# Patient Record
Sex: MALE | Race: WHITE | NOT HISPANIC OR LATINO | Employment: FULL TIME | URBAN - NONMETROPOLITAN AREA
[De-identification: names, ages, dates, MRNs, and addresses within clinical notes are randomized per-mention and may not be internally consistent; named-entity substitution may affect disease eponyms.]

---

## 2017-01-03 ENCOUNTER — HOSPITAL ENCOUNTER (OUTPATIENT)
Dept: OTHER | Facility: HOSPITAL | Age: 47
Setting detail: RADIATION/ONCOLOGY SERIES
Discharge: HOME | End: 2017-01-20
Attending: INTERNAL MEDICINE | Admitting: HOSPITALIST

## 2017-01-25 DIAGNOSIS — R53.81 MALAISE AND FATIGUE: ICD-10-CM

## 2017-01-25 DIAGNOSIS — R53.83 MALAISE AND FATIGUE: ICD-10-CM

## 2017-01-25 DIAGNOSIS — D50.8 OTHER IRON DEFICIENCY ANEMIA: ICD-10-CM

## 2017-01-25 DIAGNOSIS — E03.9 ACQUIRED HYPOTHYROIDISM: Primary | ICD-10-CM

## 2017-01-25 DIAGNOSIS — E66.3 OVERWEIGHT: ICD-10-CM

## 2017-02-10 ENCOUNTER — APPOINTMENT (OUTPATIENT)
Dept: LAB | Facility: HOSPITAL | Age: 47
End: 2017-02-10

## 2017-02-10 LAB
25(OH)D3 SERPL-MCNC: 46.7 NG/ML (ref 30–100)
ALBUMIN SERPL-MCNC: 4.3 G/DL (ref 3.4–4.8)
ALBUMIN/GLOB SERPL: 1.4 G/DL (ref 1.1–1.8)
ALP SERPL-CCNC: 53 U/L (ref 38–126)
ALT SERPL W P-5'-P-CCNC: 36 U/L (ref 21–72)
ANION GAP SERPL CALCULATED.3IONS-SCNC: 9 MMOL/L (ref 5–15)
ARTICHOKE IGE QN: 102 MG/DL (ref 1–129)
AST SERPL-CCNC: 28 U/L (ref 17–59)
BASOPHILS # BLD AUTO: 0.02 10*3/MM3 (ref 0–0.2)
BASOPHILS NFR BLD AUTO: 0.3 % (ref 0–2)
BILIRUB SERPL-MCNC: 0.7 MG/DL (ref 0.2–1.3)
BUN BLD-MCNC: 18 MG/DL (ref 7–21)
BUN/CREAT SERPL: 19.6 (ref 7–25)
CALCIUM SPEC-SCNC: 9.3 MG/DL (ref 8.4–10.2)
CHLORIDE SERPL-SCNC: 100 MMOL/L (ref 95–110)
CHOLEST SERPL-MCNC: 189 MG/DL (ref 0–199)
CO2 SERPL-SCNC: 30 MMOL/L (ref 22–31)
CREAT BLD-MCNC: 0.92 MG/DL (ref 0.7–1.3)
DEPRECATED RDW RBC AUTO: 52.7 FL (ref 35.1–43.9)
EOSINOPHIL # BLD AUTO: 0.23 10*3/MM3 (ref 0–0.7)
EOSINOPHIL NFR BLD AUTO: 3.5 % (ref 0–7)
ERYTHROCYTE [DISTWIDTH] IN BLOOD BY AUTOMATED COUNT: 16.5 % (ref 11.5–14.5)
GFR SERPL CREATININE-BSD FRML MDRD: 88 ML/MIN/1.73 (ref 63–147)
GLOBULIN UR ELPH-MCNC: 3 GM/DL (ref 2.3–3.5)
GLUCOSE BLD-MCNC: 92 MG/DL (ref 60–100)
HBA1C MFR BLD: 5.48 % (ref 4–5.6)
HCT VFR BLD AUTO: 43.6 % (ref 39–49)
HDLC SERPL-MCNC: 48 MG/DL (ref 60–200)
HGB BLD-MCNC: 14.6 G/DL (ref 13.7–17.3)
IMM GRANULOCYTES # BLD: 0.02 10*3/MM3 (ref 0–0.02)
IMM GRANULOCYTES NFR BLD: 0.3 % (ref 0–0.5)
IRON 24H UR-MRATE: 72 MCG/DL (ref 49–181)
LDLC/HDLC SERPL: 2.52 {RATIO} (ref 0–3.55)
LYMPHOCYTES # BLD AUTO: 1.6 10*3/MM3 (ref 0.6–4.2)
LYMPHOCYTES NFR BLD AUTO: 24.2 % (ref 10–50)
MAGNESIUM SERPL-MCNC: 2.1 MG/DL (ref 1.6–2.3)
MCH RBC QN AUTO: 29.2 PG (ref 26.5–34)
MCHC RBC AUTO-ENTMCNC: 33.5 G/DL (ref 31.5–36.3)
MCV RBC AUTO: 87.2 FL (ref 80–98)
MONOCYTES # BLD AUTO: 0.47 10*3/MM3 (ref 0–0.9)
MONOCYTES NFR BLD AUTO: 7.1 % (ref 0–12)
NEUTROPHILS # BLD AUTO: 4.27 10*3/MM3 (ref 2–8.6)
NEUTROPHILS NFR BLD AUTO: 64.6 % (ref 37–80)
PLATELET # BLD AUTO: 196 10*3/MM3 (ref 150–450)
PMV BLD AUTO: 9.7 FL (ref 8–12)
POTASSIUM BLD-SCNC: 4 MMOL/L (ref 3.5–5.1)
PROT SERPL-MCNC: 7.3 G/DL (ref 6.3–8.6)
RBC # BLD AUTO: 5 10*6/MM3 (ref 4.37–5.74)
SODIUM BLD-SCNC: 139 MMOL/L (ref 137–145)
TRIGL SERPL-MCNC: 100 MG/DL (ref 20–199)
TSH SERPL DL<=0.05 MIU/L-ACNC: 3.86 MIU/ML (ref 0.46–4.68)
VIT B12 BLD-MCNC: 395 PG/ML (ref 239–931)
WBC NRBC COR # BLD: 6.61 10*3/MM3 (ref 3.2–9.8)

## 2017-02-10 PROCEDURE — 80061 LIPID PANEL: CPT | Performed by: NURSE PRACTITIONER

## 2017-02-10 PROCEDURE — 36415 COLL VENOUS BLD VENIPUNCTURE: CPT | Performed by: NURSE PRACTITIONER

## 2017-02-10 PROCEDURE — 80053 COMPREHEN METABOLIC PANEL: CPT | Performed by: NURSE PRACTITIONER

## 2017-02-10 PROCEDURE — 83036 HEMOGLOBIN GLYCOSYLATED A1C: CPT | Performed by: NURSE PRACTITIONER

## 2017-02-10 PROCEDURE — 83540 ASSAY OF IRON: CPT | Performed by: NURSE PRACTITIONER

## 2017-02-10 PROCEDURE — 85025 COMPLETE CBC W/AUTO DIFF WBC: CPT | Performed by: NURSE PRACTITIONER

## 2017-02-10 PROCEDURE — 84443 ASSAY THYROID STIM HORMONE: CPT | Performed by: NURSE PRACTITIONER

## 2017-02-10 PROCEDURE — 83735 ASSAY OF MAGNESIUM: CPT | Performed by: NURSE PRACTITIONER

## 2017-02-10 PROCEDURE — 82306 VITAMIN D 25 HYDROXY: CPT | Performed by: NURSE PRACTITIONER

## 2017-02-10 PROCEDURE — 82607 VITAMIN B-12: CPT | Performed by: NURSE PRACTITIONER

## 2017-02-13 ENCOUNTER — OFFICE VISIT (OUTPATIENT)
Dept: FAMILY MEDICINE CLINIC | Facility: CLINIC | Age: 47
End: 2017-02-13

## 2017-02-13 VITALS
HEIGHT: 69 IN | SYSTOLIC BLOOD PRESSURE: 120 MMHG | WEIGHT: 219 LBS | BODY MASS INDEX: 32.44 KG/M2 | DIASTOLIC BLOOD PRESSURE: 88 MMHG

## 2017-02-13 DIAGNOSIS — R53.81 MALAISE AND FATIGUE: ICD-10-CM

## 2017-02-13 DIAGNOSIS — R53.83 MALAISE AND FATIGUE: ICD-10-CM

## 2017-02-13 DIAGNOSIS — D50.8 OTHER IRON DEFICIENCY ANEMIA: Primary | ICD-10-CM

## 2017-02-13 DIAGNOSIS — E03.9 ACQUIRED HYPOTHYROIDISM: ICD-10-CM

## 2017-02-13 PROCEDURE — 99213 OFFICE O/P EST LOW 20 MIN: CPT | Performed by: NURSE PRACTITIONER

## 2017-02-13 NOTE — PROGRESS NOTES
"  Chief Complaint   Patient presents with   • Follow-up     iron deficiency     Subjective   Jonathon Carrero is a 46 y.o. male.     Fatigue   This is a new problem. The current episode started 1 to 4 weeks ago. The problem occurs constantly. The problem has been resolved. The symptoms are aggravated by exertion. He has tried nothing for the symptoms. The treatment provided mild relief.        The following portions of the patient's history were reviewed and updated as appropriate: allergies, current medications, past social history and problem list.    Review of Systems   HENT: Negative for sneezing.    Eyes: Negative.    Respiratory: Positive for chest tightness. Negative for apnea, choking and stridor.    Cardiovascular: Negative.    Gastrointestinal: Negative for abdominal distention, anal bleeding, blood in stool, constipation, diarrhea and rectal pain.   Endocrine: Negative.    Genitourinary: Negative.    Musculoskeletal: Negative for back pain and gait problem.   Skin: Negative.    Allergic/Immunologic: Negative.    Neurological: Negative.    Hematological: Negative.    Psychiatric/Behavioral: Negative.        Objective   Visit Vitals   • /88 (BP Location: Left arm, Patient Position: Sitting, Cuff Size: Adult)   • Ht 69\" (175.3 cm)   • Wt 219 lb (99.3 kg)   • BMI 32.34 kg/m2     Physical Exam   Constitutional: He is oriented to person, place, and time. He appears well-developed and well-nourished.   HENT:   Head: Normocephalic and atraumatic.   Eyes: Conjunctivae and EOM are normal. Pupils are equal, round, and reactive to light.   Neck: Normal range of motion. Neck supple.   Cardiovascular: Normal rate, regular rhythm and normal heart sounds.    Pulmonary/Chest: Effort normal. No respiratory distress. He has no wheezes. He has no rales. He exhibits no tenderness.   Abdominal: Soft. Bowel sounds are normal. He exhibits no distension and no mass. There is no tenderness. There is no rebound and no " guarding. No hernia.   Symptoms resolved   Musculoskeletal: Normal range of motion.   Neurological: He is alert and oriented to person, place, and time. He has normal reflexes.   Skin: Skin is warm.   Psychiatric: He has a normal mood and affect.   Nursing note and vitals reviewed.      Assessment/Plan   Problem List Items Addressed This Visit        Endocrine    Acquired hypothyroidism    Relevant Orders    CBC Auto Differential    Iron    TSH       Hematopoietic and Hemostatic    Iron deficiency anemia - Primary    Relevant Orders    CBC Auto Differential    Iron    TSH       Other    Malaise and fatigue    Relevant Orders    CBC Auto Differential    Iron    TSH         No orders of the defined types were placed in this encounter.      It's not just what you eat, but when you eat  Eat breakfast, and eat smaller meals throughout the day. A healthy breakfast can jumpstart your metabolism, while eating small, healthy meals (rather than the standard three large meals) keeps your energy up.   Avoid eating at night. Try to eat dinner earlier and fast for 14-16 hours until breakfast the next morning. Studies suggest that eating only when you’re most active and giving your digestive system a long break each day may help to regulate weight.

## 2017-04-26 ENCOUNTER — APPOINTMENT (OUTPATIENT)
Dept: LAB | Facility: HOSPITAL | Age: 47
End: 2017-04-26

## 2017-04-26 ENCOUNTER — OFFICE VISIT (OUTPATIENT)
Dept: FAMILY MEDICINE CLINIC | Facility: CLINIC | Age: 47
End: 2017-04-26

## 2017-04-26 VITALS
DIASTOLIC BLOOD PRESSURE: 82 MMHG | BODY MASS INDEX: 32.26 KG/M2 | HEIGHT: 69 IN | WEIGHT: 217.8 LBS | SYSTOLIC BLOOD PRESSURE: 122 MMHG

## 2017-04-26 DIAGNOSIS — K21.9 GASTROESOPHAGEAL REFLUX DISEASE WITHOUT ESOPHAGITIS: Primary | ICD-10-CM

## 2017-04-26 DIAGNOSIS — E03.9 HYPOTHYROIDISM (ACQUIRED): Primary | ICD-10-CM

## 2017-04-26 DIAGNOSIS — R53.81 MALAISE: ICD-10-CM

## 2017-04-26 DIAGNOSIS — E03.9 ACQUIRED HYPOTHYROIDISM: ICD-10-CM

## 2017-04-26 DIAGNOSIS — D50.8 OTHER IRON DEFICIENCY ANEMIA: ICD-10-CM

## 2017-04-26 LAB
ALBUMIN SERPL-MCNC: 4.3 G/DL (ref 3.4–4.8)
ALBUMIN/GLOB SERPL: 1.4 G/DL (ref 1.1–1.8)
ALP SERPL-CCNC: 47 U/L (ref 38–126)
ALT SERPL W P-5'-P-CCNC: 34 U/L (ref 21–72)
ANION GAP SERPL CALCULATED.3IONS-SCNC: 9 MMOL/L (ref 5–15)
AST SERPL-CCNC: 35 U/L (ref 17–59)
BASOPHILS # BLD AUTO: 0.01 10*3/MM3 (ref 0–0.2)
BASOPHILS NFR BLD AUTO: 0.2 % (ref 0–2)
BILIRUB SERPL-MCNC: 0.7 MG/DL (ref 0.2–1.3)
BUN BLD-MCNC: 15 MG/DL (ref 7–21)
BUN/CREAT SERPL: 16.5 (ref 7–25)
CALCIUM SPEC-SCNC: 9.2 MG/DL (ref 8.4–10.2)
CHLORIDE SERPL-SCNC: 102 MMOL/L (ref 95–110)
CO2 SERPL-SCNC: 27 MMOL/L (ref 22–31)
CREAT BLD-MCNC: 0.91 MG/DL (ref 0.7–1.3)
DEPRECATED RDW RBC AUTO: 40.8 FL (ref 35.1–43.9)
EOSINOPHIL # BLD AUTO: 0.22 10*3/MM3 (ref 0–0.7)
EOSINOPHIL NFR BLD AUTO: 4.1 % (ref 0–7)
ERYTHROCYTE [DISTWIDTH] IN BLOOD BY AUTOMATED COUNT: 12.7 % (ref 11.5–14.5)
GFR SERPL CREATININE-BSD FRML MDRD: 89 ML/MIN/1.73 (ref 63–147)
GLOBULIN UR ELPH-MCNC: 3 GM/DL (ref 2.3–3.5)
GLUCOSE BLD-MCNC: 94 MG/DL (ref 60–100)
HCT VFR BLD AUTO: 39.7 % (ref 39–49)
HGB BLD-MCNC: 14 G/DL (ref 13.7–17.3)
IMM GRANULOCYTES # BLD: 0.01 10*3/MM3 (ref 0–0.02)
IMM GRANULOCYTES NFR BLD: 0.2 % (ref 0–0.5)
IRON 24H UR-MRATE: 48 MCG/DL (ref 49–181)
LYMPHOCYTES # BLD AUTO: 1.4 10*3/MM3 (ref 0.6–4.2)
LYMPHOCYTES NFR BLD AUTO: 25.9 % (ref 10–50)
MCH RBC QN AUTO: 31.5 PG (ref 26.5–34)
MCHC RBC AUTO-ENTMCNC: 35.3 G/DL (ref 31.5–36.3)
MCV RBC AUTO: 89.2 FL (ref 80–98)
MONOCYTES # BLD AUTO: 0.48 10*3/MM3 (ref 0–0.9)
MONOCYTES NFR BLD AUTO: 8.9 % (ref 0–12)
NEUTROPHILS # BLD AUTO: 3.29 10*3/MM3 (ref 2–8.6)
NEUTROPHILS NFR BLD AUTO: 60.7 % (ref 37–80)
PLATELET # BLD AUTO: 196 10*3/MM3 (ref 150–450)
PMV BLD AUTO: 9.8 FL (ref 8–12)
POTASSIUM BLD-SCNC: 4.2 MMOL/L (ref 3.5–5.1)
PROT SERPL-MCNC: 7.3 G/DL (ref 6.3–8.6)
PSA SERPL-MCNC: 0.4 NG/ML (ref 0–4)
RBC # BLD AUTO: 4.45 10*6/MM3 (ref 4.37–5.74)
SODIUM BLD-SCNC: 138 MMOL/L (ref 137–145)
TSH SERPL DL<=0.05 MIU/L-ACNC: 4.09 MIU/ML (ref 0.46–4.68)
WBC NRBC COR # BLD: 5.41 10*3/MM3 (ref 3.2–9.8)

## 2017-04-26 PROCEDURE — 85025 COMPLETE CBC W/AUTO DIFF WBC: CPT | Performed by: NURSE PRACTITIONER

## 2017-04-26 PROCEDURE — 84443 ASSAY THYROID STIM HORMONE: CPT | Performed by: NURSE PRACTITIONER

## 2017-04-26 PROCEDURE — 36415 COLL VENOUS BLD VENIPUNCTURE: CPT | Performed by: NURSE PRACTITIONER

## 2017-04-26 PROCEDURE — 84153 ASSAY OF PSA TOTAL: CPT | Performed by: NURSE PRACTITIONER

## 2017-04-26 PROCEDURE — 84402 ASSAY OF FREE TESTOSTERONE: CPT | Performed by: NURSE PRACTITIONER

## 2017-04-26 PROCEDURE — 80053 COMPREHEN METABOLIC PANEL: CPT | Performed by: NURSE PRACTITIONER

## 2017-04-26 PROCEDURE — 83540 ASSAY OF IRON: CPT | Performed by: NURSE PRACTITIONER

## 2017-04-26 PROCEDURE — 99214 OFFICE O/P EST MOD 30 MIN: CPT | Performed by: NURSE PRACTITIONER

## 2017-04-26 PROCEDURE — 84403 ASSAY OF TOTAL TESTOSTERONE: CPT | Performed by: NURSE PRACTITIONER

## 2017-04-26 RX ORDER — LEVOTHYROXINE SODIUM 0.12 MG/1
125 TABLET ORAL DAILY
Qty: 30 TABLET | Refills: 11 | Status: SHIPPED | OUTPATIENT
Start: 2017-04-26 | End: 2017-11-29 | Stop reason: SDUPTHER

## 2017-04-26 RX ORDER — LEVOTHYROXINE SODIUM 125 MCG
125 TABLET ORAL DAILY
Qty: 30 TABLET | Refills: 11 | Status: SHIPPED | OUTPATIENT
Start: 2017-04-26 | End: 2017-05-04 | Stop reason: SDUPTHER

## 2017-04-26 RX ORDER — FERROUS SULFATE 325(65) MG
325 TABLET ORAL
Qty: 30 TABLET | Refills: 0 | Status: SHIPPED | OUTPATIENT
Start: 2017-04-26 | End: 2017-08-14 | Stop reason: SDUPTHER

## 2017-04-26 NOTE — PROGRESS NOTES
Chief Complaint   Patient presents with   • Follow-up     wanting referred to Gastro for EGD   • Heartburn     has discomfort    • Bloated     Subjective   Jonathon Carrero is a 47 y.o. male.     Heartburn   He complains of abdominal pain, belching, chest pain, dysphagia and heartburn. He reports no choking, no coughing, no early satiety, no globus sensation, no hoarse voice, no nausea, no sore throat, no stridor, no tooth decay, no water brash or no wheezing. hx of known gerd but symptoms are worsening -has been taking otc iron supplements and stopped taking this week due to pain-thinks it is making worse . This is a recurrent problem. The current episode started more than 1 month ago. The problem occurs constantly. The problem has been gradually worsening. The symptoms are aggravated by caffeine. Associated symptoms include fatigue. Pertinent negatives include no anemia, melena, muscle weakness, orthopnea or weight loss. Risk factors include caffeine use and ETOH use. He has tried a PPI for the symptoms. The treatment provided mild relief. Past procedures do not include an abdominal ultrasound, an EGD, esophageal manometry, esophageal pH monitoring, H. pylori antibody titer or a UGI. Past invasive treatments do not include gastroplasty, gastroplication or reflux surgery.   Fatigue   This is a recurrent problem. The current episode started more than 1 month ago. The problem occurs constantly. The problem has been gradually worsening. Associated symptoms include abdominal pain, chest pain and fatigue. Pertinent negatives include no anorexia, arthralgias, chills, congestion, coughing, fever, headaches, joint swelling, myalgias, nausea, neck pain, numbness, rash, sore throat, swollen glands, urinary symptoms, vertigo or visual change. Nothing aggravates the symptoms. Treatments tried: rest, exercise  The treatment provided mild relief.        The following portions of the patient's history were reviewed and  "updated as appropriate: allergies, current medications, past social history and problem list.    Review of Systems   Constitutional: Positive for activity change and fatigue. Negative for chills, fever and weight loss.   HENT: Negative.  Negative for congestion, hoarse voice and sore throat.    Eyes: Negative.    Respiratory: Negative.  Negative for cough, choking and wheezing.    Cardiovascular: Positive for chest pain. Negative for palpitations and leg swelling.   Gastrointestinal: Positive for abdominal distention, abdominal pain, dysphagia and heartburn. Negative for anal bleeding, anorexia, blood in stool, melena and nausea.        Gerd symptoms worsening-reports worse since taking iron supplements    Endocrine: Negative.    Genitourinary: Negative.    Musculoskeletal: Negative.  Negative for arthralgias, joint swelling, myalgias, muscle weakness and neck pain.   Skin: Negative.  Negative for rash.   Allergic/Immunologic: Negative.    Neurological: Negative.  Negative for vertigo, numbness and headaches.   Hematological: Negative.    Psychiatric/Behavioral: Negative.        Objective   /82  Ht 69\" (175.3 cm)  Wt 217 lb 12.8 oz (98.8 kg)  BMI 32.16 kg/m2  Physical Exam   Constitutional: He is oriented to person, place, and time. He appears well-developed and well-nourished.   HENT:   Head: Normocephalic and atraumatic.   Eyes: Conjunctivae and EOM are normal. Pupils are equal, round, and reactive to light.   Neck: Normal range of motion. Neck supple.   Cardiovascular: Normal rate, regular rhythm, normal heart sounds and intact distal pulses.  Exam reveals no gallop and no friction rub.    No murmur heard.  Pulmonary/Chest: Effort normal. No respiratory distress. He has no wheezes. He has no rales. He exhibits no tenderness.   Abdominal: Soft. Bowel sounds are normal. He exhibits no distension and no mass. There is tenderness. There is no rebound and no guarding. No hernia.   Epigastric tenderness with " exam    Musculoskeletal: Normal range of motion. He exhibits no edema, tenderness or deformity.   Neurological: He is alert and oriented to person, place, and time. He has normal reflexes. He displays normal reflexes. No cranial nerve deficit. He exhibits normal muscle tone. Coordination normal.   Skin: Skin is warm and dry.   Psychiatric: He has a normal mood and affect.   Nursing note and vitals reviewed.      Assessment/Plan   Problem List Items Addressed This Visit        Digestive    Gastroesophageal reflux disease without esophagitis - Primary    Relevant Orders    Ambulatory Referral to Gastroenterology    CBC & Differential (Completed)    Comprehensive Metabolic Panel (Completed)    PSA (Completed)    Testosterone, Free, Total    CBC Auto Differential (Completed)       Endocrine    Acquired hypothyroidism    Relevant Medications    SYNTHROID 125 MCG tablet       Hematopoietic and Hemostatic    Iron deficiency anemia    Relevant Medications    ferrous sulfate (FERROUSUL) 325 (65 FE) MG tablet       Other    Malaise    Relevant Orders    CBC & Differential (Completed)    Comprehensive Metabolic Panel (Completed)    PSA (Completed)    Testosterone, Free, Total    CBC Auto Differential (Completed)           New Medications Ordered This Visit   Medications   • SYNTHROID 125 MCG tablet     Sig: Take 1 tablet by mouth Daily.     Dispense:  30 tablet     Refill:  11   • ferrous sulfate (FERROUSUL) 325 (65 FE) MG tablet     Sig: Take 1 tablet by mouth Daily With Breakfast.     Dispense:  30 tablet     Refill:  0       It's not just what you eat, but when you eat  Eat breakfast, and eat smaller meals throughout the day. A healthy breakfast can jumpstart your metabolism, while eating small, healthy meals (rather than the standard three large meals) keeps your energy up.   Avoid eating at night. Try to eat dinner earlier and fast for 14-16 hours until breakfast the next morning. Studies suggest that eating only when  you’re most active and giving your digestive system a long break each day may help to regulate weight.    I would suggest patient to have another iron infusion due to symptoms with otc iron and gastritis -hold iron for now and will order infusion as directed

## 2017-04-28 ENCOUNTER — INFUSION (OUTPATIENT)
Dept: ONCOLOGY | Facility: HOSPITAL | Age: 47
End: 2017-04-28

## 2017-04-28 VITALS
SYSTOLIC BLOOD PRESSURE: 151 MMHG | RESPIRATION RATE: 16 BRPM | DIASTOLIC BLOOD PRESSURE: 79 MMHG | HEART RATE: 65 BPM | TEMPERATURE: 97.1 F

## 2017-04-28 DIAGNOSIS — D50.8 OTHER IRON DEFICIENCY ANEMIA: Primary | ICD-10-CM

## 2017-04-28 LAB
CONV COMMENT: ABNORMAL
TESTOST FREE SERPL-MCNC: 8.1 PG/ML (ref 6.8–21.5)
TESTOST SERPL-MCNC: 331 NG/DL (ref 348–1197)

## 2017-04-28 PROCEDURE — 96375 TX/PRO/DX INJ NEW DRUG ADDON: CPT | Performed by: HOSPITALIST

## 2017-04-28 PROCEDURE — 25010000002 FERUMOXYTOL 510 MG/17ML SOLUTION 510 MG VIAL: Performed by: HOSPITALIST

## 2017-04-28 PROCEDURE — 96365 THER/PROPH/DIAG IV INF INIT: CPT | Performed by: HOSPITALIST

## 2017-04-28 RX ORDER — SODIUM CHLORIDE 9 MG/ML
250 INJECTION, SOLUTION INTRAVENOUS ONCE
Status: COMPLETED | OUTPATIENT
Start: 2017-04-28 | End: 2017-04-28

## 2017-04-28 RX ORDER — TESTOSTERONE 20.25 MG/1.25G
1 GEL TOPICAL DAILY
Qty: 1.25 G | Refills: 0 | Status: SHIPPED | OUTPATIENT
Start: 2017-04-28 | End: 2017-05-10 | Stop reason: CLARIF

## 2017-04-28 RX ORDER — SODIUM CHLORIDE 9 MG/ML
250 INJECTION, SOLUTION INTRAVENOUS ONCE
Status: CANCELLED | OUTPATIENT
Start: 2017-05-05

## 2017-04-28 RX ADMIN — SODIUM CHLORIDE 250 ML: 9 INJECTION, SOLUTION INTRAVENOUS at 10:50

## 2017-04-28 RX ADMIN — FERUMOXYTOL 510 MG: 510 INJECTION INTRAVENOUS at 11:17

## 2017-04-28 NOTE — PROGRESS NOTES
LANRE Cooper,  Mr. Carrero has been called with his recent lab results & recommendations.  Continue his current medications and follow-up as planned or sooner if any problems.  Appt scheduled for Monday 5/15/17 @ 8:15. For testosterone replacement therapy   Unless you want to call and talk to him over the phone.

## 2017-05-04 ENCOUNTER — OFFICE VISIT (OUTPATIENT)
Dept: GASTROENTEROLOGY | Facility: CLINIC | Age: 47
End: 2017-05-04

## 2017-05-04 VITALS
BODY MASS INDEX: 32.47 KG/M2 | HEART RATE: 163 BPM | WEIGHT: 219.2 LBS | DIASTOLIC BLOOD PRESSURE: 79 MMHG | HEIGHT: 69 IN | SYSTOLIC BLOOD PRESSURE: 118 MMHG

## 2017-05-04 DIAGNOSIS — K21.00 GASTROESOPHAGEAL REFLUX DISEASE WITH ESOPHAGITIS: ICD-10-CM

## 2017-05-04 DIAGNOSIS — Z87.19 HISTORY OF BARRETT'S ESOPHAGUS: ICD-10-CM

## 2017-05-04 DIAGNOSIS — R14.0 BLOATING: ICD-10-CM

## 2017-05-04 DIAGNOSIS — R10.13 EPIGASTRIC PAIN: Primary | ICD-10-CM

## 2017-05-04 PROCEDURE — 99203 OFFICE O/P NEW LOW 30 MIN: CPT | Performed by: NURSE PRACTITIONER

## 2017-05-04 RX ORDER — DEXTROSE AND SODIUM CHLORIDE 5; .45 G/100ML; G/100ML
30 INJECTION, SOLUTION INTRAVENOUS CONTINUOUS PRN
Status: CANCELLED | OUTPATIENT
Start: 2017-05-26

## 2017-05-04 RX ORDER — PANTOPRAZOLE SODIUM 40 MG/1
40 TABLET, DELAYED RELEASE ORAL DAILY
Qty: 30 TABLET | Refills: 11 | Status: SHIPPED | OUTPATIENT
Start: 2017-05-04 | End: 2017-06-06

## 2017-05-05 ENCOUNTER — INFUSION (OUTPATIENT)
Dept: ONCOLOGY | Facility: HOSPITAL | Age: 47
End: 2017-05-05

## 2017-05-05 VITALS
RESPIRATION RATE: 18 BRPM | SYSTOLIC BLOOD PRESSURE: 142 MMHG | DIASTOLIC BLOOD PRESSURE: 76 MMHG | HEART RATE: 71 BPM | TEMPERATURE: 96.9 F

## 2017-05-05 DIAGNOSIS — D50.8 OTHER IRON DEFICIENCY ANEMIA: Primary | ICD-10-CM

## 2017-05-05 PROCEDURE — 25010000002 FERUMOXYTOL 510 MG/17ML SOLUTION 510 MG VIAL: Performed by: HOSPITALIST

## 2017-05-05 PROCEDURE — 96374 THER/PROPH/DIAG INJ IV PUSH: CPT | Performed by: HOSPITALIST

## 2017-05-05 RX ORDER — SODIUM CHLORIDE 9 MG/ML
250 INJECTION, SOLUTION INTRAVENOUS ONCE
Status: COMPLETED | OUTPATIENT
Start: 2017-05-05 | End: 2017-05-05

## 2017-05-05 RX ORDER — SODIUM CHLORIDE 9 MG/ML
250 INJECTION, SOLUTION INTRAVENOUS ONCE
Status: CANCELLED | OUTPATIENT
Start: 2017-05-12

## 2017-05-05 RX ADMIN — SODIUM CHLORIDE 250 ML: 9 INJECTION, SOLUTION INTRAVENOUS at 10:17

## 2017-05-05 RX ADMIN — FERUMOXYTOL 510 MG: 510 INJECTION INTRAVENOUS at 10:18

## 2017-05-10 DIAGNOSIS — R79.89 LOW TESTOSTERONE LEVEL IN MALE: Primary | ICD-10-CM

## 2017-05-26 ENCOUNTER — ANESTHESIA EVENT (OUTPATIENT)
Dept: GASTROENTEROLOGY | Facility: HOSPITAL | Age: 47
End: 2017-05-26

## 2017-05-26 ENCOUNTER — HOSPITAL ENCOUNTER (OUTPATIENT)
Facility: HOSPITAL | Age: 47
Setting detail: HOSPITAL OUTPATIENT SURGERY
Discharge: HOME | End: 2017-05-26
Attending: INTERNAL MEDICINE | Admitting: INTERNAL MEDICINE

## 2017-05-26 ENCOUNTER — ANESTHESIA (OUTPATIENT)
Dept: GASTROENTEROLOGY | Facility: HOSPITAL | Age: 47
End: 2017-05-26

## 2017-05-26 VITALS
HEIGHT: 69 IN | SYSTOLIC BLOOD PRESSURE: 106 MMHG | TEMPERATURE: 97.6 F | BODY MASS INDEX: 32.2 KG/M2 | WEIGHT: 217.37 LBS | OXYGEN SATURATION: 95 % | RESPIRATION RATE: 18 BRPM | HEART RATE: 72 BPM | DIASTOLIC BLOOD PRESSURE: 65 MMHG

## 2017-05-26 DIAGNOSIS — R14.0 BLOATING: ICD-10-CM

## 2017-05-26 DIAGNOSIS — K21.00 GASTROESOPHAGEAL REFLUX DISEASE WITH ESOPHAGITIS: ICD-10-CM

## 2017-05-26 DIAGNOSIS — R10.13 EPIGASTRIC PAIN: ICD-10-CM

## 2017-05-26 DIAGNOSIS — Z87.19 HISTORY OF BARRETT'S ESOPHAGUS: ICD-10-CM

## 2017-05-26 PROCEDURE — 88342 IMHCHEM/IMCYTCHM 1ST ANTB: CPT | Performed by: PATHOLOGY

## 2017-05-26 PROCEDURE — 88305 TISSUE EXAM BY PATHOLOGIST: CPT | Performed by: INTERNAL MEDICINE

## 2017-05-26 PROCEDURE — 25010000002 PROPOFOL 10 MG/ML EMULSION: Performed by: NURSE ANESTHETIST, CERTIFIED REGISTERED

## 2017-05-26 PROCEDURE — 25010000002 MIDAZOLAM PER 1 MG: Performed by: NURSE ANESTHETIST, CERTIFIED REGISTERED

## 2017-05-26 PROCEDURE — 43239 EGD BIOPSY SINGLE/MULTIPLE: CPT | Performed by: INTERNAL MEDICINE

## 2017-05-26 PROCEDURE — 88305 TISSUE EXAM BY PATHOLOGIST: CPT | Performed by: PATHOLOGY

## 2017-05-26 PROCEDURE — 88342 IMHCHEM/IMCYTCHM 1ST ANTB: CPT | Performed by: INTERNAL MEDICINE

## 2017-05-26 RX ORDER — PROPOFOL 10 MG/ML
VIAL (ML) INTRAVENOUS AS NEEDED
Status: DISCONTINUED | OUTPATIENT
Start: 2017-05-26 | End: 2017-05-26 | Stop reason: SURG

## 2017-05-26 RX ORDER — DEXTROSE AND SODIUM CHLORIDE 5; .45 G/100ML; G/100ML
30 INJECTION, SOLUTION INTRAVENOUS CONTINUOUS PRN
Status: DISCONTINUED | OUTPATIENT
Start: 2017-05-26 | End: 2017-05-26 | Stop reason: HOSPADM

## 2017-05-26 RX ORDER — MIDAZOLAM HYDROCHLORIDE 1 MG/ML
INJECTION INTRAMUSCULAR; INTRAVENOUS AS NEEDED
Status: DISCONTINUED | OUTPATIENT
Start: 2017-05-26 | End: 2017-05-26 | Stop reason: SURG

## 2017-05-26 RX ADMIN — DEXTROSE AND SODIUM CHLORIDE 30 ML/HR: 5; 450 INJECTION, SOLUTION INTRAVENOUS at 11:52

## 2017-05-26 RX ADMIN — MIDAZOLAM 2 MG: 1 INJECTION INTRAMUSCULAR; INTRAVENOUS at 12:45

## 2017-05-26 RX ADMIN — PROPOFOL 70 MG: 10 INJECTION, EMULSION INTRAVENOUS at 12:50

## 2017-05-31 LAB
LAB AP CASE REPORT: NORMAL
Lab: NORMAL
PATH REPORT.FINAL DX SPEC: NORMAL
PATH REPORT.GROSS SPEC: NORMAL

## 2017-06-06 ENCOUNTER — OFFICE VISIT (OUTPATIENT)
Dept: GASTROENTEROLOGY | Facility: CLINIC | Age: 47
End: 2017-06-06

## 2017-06-06 VITALS
HEART RATE: 75 BPM | BODY MASS INDEX: 32.41 KG/M2 | HEIGHT: 69 IN | WEIGHT: 218.8 LBS | SYSTOLIC BLOOD PRESSURE: 168 MMHG | DIASTOLIC BLOOD PRESSURE: 81 MMHG

## 2017-06-06 DIAGNOSIS — R10.13 EPIGASTRIC PAIN: ICD-10-CM

## 2017-06-06 DIAGNOSIS — R13.10 DYSPHAGIA, UNSPECIFIED TYPE: Primary | ICD-10-CM

## 2017-06-06 PROCEDURE — 99214 OFFICE O/P EST MOD 30 MIN: CPT | Performed by: NURSE PRACTITIONER

## 2017-06-06 RX ORDER — DEXLANSOPRAZOLE 60 MG/1
60 CAPSULE, DELAYED RELEASE ORAL DAILY
Qty: 30 CAPSULE | Refills: 3 | Status: SHIPPED | OUTPATIENT
Start: 2017-06-06 | End: 2017-07-06

## 2017-06-06 RX ORDER — SUCRALFATE 1 G/1
1 TABLET ORAL 4 TIMES DAILY
Qty: 120 TABLET | Refills: 3 | Status: SHIPPED | OUTPATIENT
Start: 2017-06-06 | End: 2017-08-19 | Stop reason: HOSPADM

## 2017-06-06 NOTE — PROGRESS NOTES
Chief Complaint   Patient presents with   • EGD     follow-up       Subjective    Jonathon Carrero is a 47 y.o. male. he is here today for follow-up.    History of Present Illness  Patient seen for follow-up of epigastric pain and worsening reflux.  States is currently not having any pain however still occurs intermittently described as acute sharp pain that will last several hours associated with bloating and painful swallowing.  Denies any changes in his bowel habits he denies any melena or hematochezia.  EGD was completed 5/26/17 which noted severe esophagitis without bleeding, gastritis, normal duodenum.  Gastric antrum biopsy noted reactive gastropathy, negative for H. pylori.  Distal esophagus biopsy noted benign gastric and squama's mucosa, active gastritis with granulation tissue response fibropurulent exudate consistent with ulcer.  Negative for specialized Lewis's mucosa.  Patient has been on Protonix for the last month states he has had fewer breakthrough symptoms however frequently he does have symptoms at night.  Plan; we'll discontinue Protonix and start Dexilant 60 mg per day.  Schedule esophagram.  Start Carafate 4 times per day.  Follow-up in one to 2 months.  Return to office sooner if needed.    The following portions of the patient's history were reviewed and updated as appropriate:   Past Medical History:   Diagnosis Date   • Acquired hypothyroidism    • Acute bronchitis    • Acute maxillary sinusitis    • Acute otitis media    • Acute pharyngitis    • Acute wheezy bronchitis    • Contracture of ankle    • Deformity of calcaneus    • Iron deficiency anemia    • Malaise and fatigue    • Obesity     bmi 32   • Peroneal tendinitis    • Preoperative examination     surgical clearance   • Surgical followup    • TMJ (temporomandibular joint disorder)      Past Surgical History:   Procedure Laterality Date   • COLONOSCOPY  03/15/2013    Internal and external hemorrhoids found.   • DEBRIDEMENT LEG   12/04/2014    Debridement and repair of peroneal tendon as well as resection of peroneal tubercle right   • ENDOSCOPY  03/15/2013    esophagitis seen. Biopsy taken. A hiatus hernia found in the stomach. Gastritis found in the stomach. Biopsy taken. Normal duodenum.   • ENDOSCOPY  06/16/2011    esophagitis seen. Biopsy taken. A hiatus hernia found in the stomach. Gastritis found in the stomach. Biopsy taken. Normal duodenum.   • ENDOSCOPY N/A 5/26/2017    Procedure: ESOPHAGOGASTRODUODENOSCOPY possible dilation ;  Surgeon: Murali Brower MD;  Location: [facility] ENDOSCOPY;  Service:    • ENTEROSCOPY SMALL BOWEL  03/15/2013    Capsule took greater then 3 hours to pass through stomach. Small bowel appeared normal. Although prp was robert.   • INJECTION OF MEDICATION  09/22/2013    Dexamethasone   • INJECTION OF MEDICATION  10/10/2015    Kenalog   • INJECTION OF MEDICATION  10/10/2015    Rocephin   • JOINT/ SOFT TISSUE INJECTION CONVERTED  09/22/2013    Small Joint Injection/Aspiration      Family History   Problem Relation Age of Onset   • Diabetes Other    • Heart attack Father        Current Outpatient Prescriptions   Medication Sig Dispense Refill   • ferrous sulfate (FERROUSUL) 325 (65 FE) MG tablet Take 1 tablet by mouth Daily With Breakfast. 30 tablet 0   • levothyroxine (SYNTHROID) 125 MCG tablet Take 1 tablet by mouth Daily. 30 tablet 11   • testosterone (ANDRODERM) 2 MG/24HR patch 24 hour 24 hour patch Place 1 patch on the skin Every Night. 30 patch 5   • dexlansoprazole (DEXILANT) 60 MG capsule Take 1 capsule by mouth Daily for 30 days. 30 capsule 3   • sucralfate (CARAFATE) 1 G tablet Take 1 tablet by mouth 4 (Four) Times a Day. 120 tablet 3     No current facility-administered medications for this visit.      No Known Allergies  Social History     Social History   • Marital status:      Spouse name: N/A   • Number of children: N/A   • Years of education: N/A     Social History Main Topics   • Smoking  "status: Former Smoker   • Smokeless tobacco: Never Used   • Alcohol use No   • Drug use: Not on file   • Sexual activity: Not on file     Other Topics Concern   • Not on file     Social History Narrative       Review of Systems  Review of Systems   Constitutional: Negative for activity change, appetite change, chills, diaphoresis, fatigue, fever and unexpected weight change.   HENT: Negative for sore throat and trouble swallowing.    Respiratory: Negative for shortness of breath.    Gastrointestinal: Positive for abdominal pain (epigastric). Negative for abdominal distention, anal bleeding, blood in stool, constipation, diarrhea, nausea, rectal pain and vomiting.   Musculoskeletal: Negative for arthralgias.   Skin: Negative for pallor.   Neurological: Negative for light-headedness.        /81  Pulse 75  Ht 69\" (175.3 cm)  Wt 218 lb 12.8 oz (99.2 kg)  BMI 32.31 kg/m2    Objective    Physical Exam   Constitutional: He is oriented to person, place, and time. He appears well-developed and well-nourished. He is cooperative. No distress.   HENT:   Head: Normocephalic and atraumatic.   Neck: Normal range of motion. Neck supple. No thyromegaly present.   Cardiovascular: Normal rate, regular rhythm and normal heart sounds.    Pulmonary/Chest: Effort normal and breath sounds normal. He has no wheezes. He has no rhonchi. He has no rales.   Abdominal: Soft. Normal appearance and bowel sounds are normal. He exhibits no shifting dullness and no distension. There is no hepatosplenomegaly. There is no tenderness. There is no rigidity and no guarding. No hernia.   Lymphadenopathy:     He has no cervical adenopathy.   Neurological: He is alert and oriented to person, place, and time.   Skin: Skin is warm, dry and intact. No rash noted. No pallor.   Psychiatric: He has a normal mood and affect. His speech is normal.     Admission on 05/26/2017, Discharged on 05/26/2017   Component Date Value Ref Range Status   • Case Report " 05/26/2017    Final                    Value:Surgical Pathology Report                         Case: [id number]                                  Authorizing Provider:  Murali Brower MD         Collected:           05/26/2017 01:04 PM          Ordering Location:     [facility]             Received:            05/26/2017 01:48 PM                                 [facility]                                                     Pathologist:           Mynor Montelongo MD                                                            Specimens:   1) - Gastric, Antrum, antrum bx                                                                     2) - Esophagus, Distal, distal esophagus bx                                               • Final Diagnosis 05/26/2017    Final                    Value:This result contains rich text formatting which cannot be displayed here.   • Gross Description 05/26/2017    Final                    Value:This result contains rich text formatting which cannot be displayed here.     Assessment/Plan      1. Dysphagia, unspecified type    2. Epigastric pain    .       Orders placed during this encounter include:  Orders Placed This Encounter   Procedures   • FL Esophagram Complete     Order Specific Question:   Reason for Exam:     Answer:   painful swallowing, epigastric pain       * Surgery not found *    Review and/or summary of lab tests, radiology, procedures, medications. Review and summary of old records and obtaining of history. The risks and benefits of my recommendations, as well as other treatment options were discussed with the patient today. Questions were answered.    New Medications Ordered This Visit   Medications   • dexlansoprazole (DEXILANT) 60 MG capsule     Sig: Take 1 capsule by mouth Daily for 30 days.     Dispense:  30 capsule     Refill:  3   • sucralfate (CARAFATE) 1 G tablet     Sig: Take 1 tablet by mouth 4 (Four) Times a Day.     Dispense:  120 tablet      "Refill:  3       Follow-up: No Follow-up on file.          This document has been electronically signed by LANRE Choe on June 6, 2017 1:45 PM             Results for orders placed or performed during the hospital encounter of 05/26/17   Tissue Exam   Result Value Ref Range    Case Report       Surgical Pathology Report                         Case: [id number]                                  Authorizing Provider:  Murali Brower MD         Collected:           05/26/2017 01:04 PM          Ordering Location:     [facility]             Received:            05/26/2017 01:48 PM                                 [facility]                                                     Pathologist:           Mynor Montelongo MD                                                            Specimens:   1) - Gastric, Antrum, antrum bx                                                                     2) - Esophagus, Distal, distal esophagus bx                                                Final Diagnosis       1. GASTRIC ANTRUM, BIOPSY:  REACTIVE GASTROPATHY.  NEGATIVE FOR HELICOBACTER PYLORI (HP IMMUNOSTAIN).    2. DISTAL ESOPHAGUS, BIOPSY:  BENIGN GASTRIC AND SQUAMOUS MUCOSA.  ACTIVE GASTRITIS WITH GRANULATION TISSUE RESPONSE.  FIBRINOPURULENT EXUDATE, CONSISTENT WITH ULCER.  NEGATIVE FOR SPECIALIZED PEACE'S MUCOSA.        Gross Description       1.  The first specimen is labeled \"gastric antrum\" and consists of 2 tan fragments measuring 0.8 x 0.4 x 0.2 cm together.  Totally submitted.    2.  The second specimen is labeled \"distal esophagus bx\" and consists of 4 gray fragments measuring 0.8 x 0.7 x 0.2 cm together.  Totally submitted.      Embedded Images     Results for orders placed or performed in visit on 04/26/17   CBC Auto Differential   Result Value Ref Range    WBC 5.41 3.20 - 9.80 10*3/mm3    RBC 4.45 4.37 - 5.74 10*6/mm3    Hemoglobin 14.0 13.7 - 17.3 g/dL    Hematocrit 39.7 39.0 - 49.0 %    MCV " 89.2 80.0 - 98.0 fL    MCH 31.5 26.5 - 34.0 pg    MCHC 35.3 31.5 - 36.3 g/dL    RDW 12.7 11.5 - 14.5 %    RDW-SD 40.8 35.1 - 43.9 fl    MPV 9.8 8.0 - 12.0 fL    Platelets 196 150 - 450 10*3/mm3    Neutrophil % 60.7 37.0 - 80.0 %    Lymphocyte % 25.9 10.0 - 50.0 %    Monocyte % 8.9 0.0 - 12.0 %    Eosinophil % 4.1 0.0 - 7.0 %    Basophil % 0.2 0.0 - 2.0 %    Immature Grans % 0.2 0.0 - 0.5 %    Neutrophils, Absolute 3.29 2.00 - 8.60 10*3/mm3    Lymphocytes, Absolute 1.40 0.60 - 4.20 10*3/mm3    Monocytes, Absolute 0.48 0.00 - 0.90 10*3/mm3    Eosinophils, Absolute 0.22 0.00 - 0.70 10*3/mm3    Basophils, Absolute 0.01 0.00 - 0.20 10*3/mm3    Immature Grans, Absolute 0.01 0.00 - 0.02 10*3/mm3   Testosterone, Free, Total   Result Value Ref Range    Testosterone, Total 331 (L) 348 - 1197 ng/dL    Comment Comment     Testosterone, Free 8.1 6.8 - 21.5 pg/mL   PSA   Result Value Ref Range    PSA 0.398 0.000 - 4.000 ng/mL   Comprehensive Metabolic Panel   Result Value Ref Range    Glucose 94 60 - 100 mg/dL    BUN 15 7 - 21 mg/dL    Creatinine 0.91 0.70 - 1.30 mg/dL    Sodium 138 137 - 145 mmol/L    Potassium 4.2 3.5 - 5.1 mmol/L    Chloride 102 95 - 110 mmol/L    CO2 27.0 22.0 - 31.0 mmol/L    Calcium 9.2 8.4 - 10.2 mg/dL    Total Protein 7.3 6.3 - 8.6 g/dL    Albumin 4.30 3.40 - 4.80 g/dL    ALT (SGPT) 34 21 - 72 U/L    AST (SGOT) 35 17 - 59 U/L    Alkaline Phosphatase 47 38 - 126 U/L    Total Bilirubin 0.7 0.2 - 1.3 mg/dL    eGFR Non  Amer 89 63 - 147 mL/min/1.73    Globulin 3.0 2.3 - 3.5 gm/dL    A/G Ratio 1.4 1.1 - 1.8 g/dL    BUN/Creatinine Ratio 16.5 7.0 - 25.0    Anion Gap 9.0 5.0 - 15.0 mmol/L   Results for orders placed or performed in visit on 02/13/17   TSH   Result Value Ref Range    TSH 4.090 0.460 - 4.680 mIU/mL   Iron   Result Value Ref Range    Iron 48 (L) 49 - 181 mcg/dL   Results for orders placed or performed in visit on 01/25/17   CBC Auto Differential   Result Value Ref Range    WBC 6.61 3.20 -  9.80 10*3/mm3    RBC 5.00 4.37 - 5.74 10*6/mm3    Hemoglobin 14.6 13.7 - 17.3 g/dL    Hematocrit 43.6 39.0 - 49.0 %    MCV 87.2 80.0 - 98.0 fL    MCH 29.2 26.5 - 34.0 pg    MCHC 33.5 31.5 - 36.3 g/dL    RDW 16.5 (H) 11.5 - 14.5 %    RDW-SD 52.7 (H) 35.1 - 43.9 fl    MPV 9.7 8.0 - 12.0 fL    Platelets 196 150 - 450 10*3/mm3    Neutrophil % 64.6 37.0 - 80.0 %    Lymphocyte % 24.2 10.0 - 50.0 %    Monocyte % 7.1 0.0 - 12.0 %    Eosinophil % 3.5 0.0 - 7.0 %    Basophil % 0.3 0.0 - 2.0 %    Immature Grans % 0.3 0.0 - 0.5 %    Neutrophils, Absolute 4.27 2.00 - 8.60 10*3/mm3    Lymphocytes, Absolute 1.60 0.60 - 4.20 10*3/mm3    Monocytes, Absolute 0.47 0.00 - 0.90 10*3/mm3    Eosinophils, Absolute 0.23 0.00 - 0.70 10*3/mm3    Basophils, Absolute 0.02 0.00 - 0.20 10*3/mm3    Immature Grans, Absolute 0.02 0.00 - 0.02 10*3/mm3   Vitamin D 25 Hydroxy   Result Value Ref Range    25 Hydroxy, Vitamin D 46.7 30.0 - 100.0 ng/ml   TSH   Result Value Ref Range    TSH 3.860 0.460 - 4.680 mIU/mL   Magnesium   Result Value Ref Range    Magnesium 2.1 1.6 - 2.3 mg/dL   Iron   Result Value Ref Range    Iron 72 49 - 181 mcg/dL   Hemoglobin A1c   Result Value Ref Range    Hemoglobin A1C 5.48 4 - 5.6 %   Vitamin B12   Result Value Ref Range    Vitamin B-12 395 239 - 931 pg/mL   Lipid Panel   Result Value Ref Range    Total Cholesterol 189 0 - 199 mg/dL    Triglycerides 100 20 - 199 mg/dL    HDL Cholesterol 48 (L) 60 - 200 mg/dL    LDL Cholesterol  102 1 - 129 mg/dL    LDL/HDL Ratio 2.52 0.00 - 3.55   Comprehensive Metabolic Panel   Result Value Ref Range    Glucose 92 60 - 100 mg/dL    BUN 18 7 - 21 mg/dL    Creatinine 0.92 0.70 - 1.30 mg/dL    Sodium 139 137 - 145 mmol/L    Potassium 4.0 3.5 - 5.1 mmol/L    Chloride 100 95 - 110 mmol/L    CO2 30.0 22.0 - 31.0 mmol/L    Calcium 9.3 8.4 - 10.2 mg/dL    Total Protein 7.3 6.3 - 8.6 g/dL    Albumin 4.30 3.40 - 4.80 g/dL    ALT (SGPT) 36 21 - 72 U/L    AST (SGOT) 28 17 - 59 U/L    Alkaline  Phosphatase 53 38 - 126 U/L    Total Bilirubin 0.7 0.2 - 1.3 mg/dL    eGFR Non  Amer 88 63 - 147 mL/min/1.73    Globulin 3.0 2.3 - 3.5 gm/dL    A/G Ratio 1.4 1.1 - 1.8 g/dL    BUN/Creatinine Ratio 19.6 7.0 - 25.0    Anion Gap 9.0 5.0 - 15.0 mmol/L     *Note: Due to a large number of results and/or encounters for the requested time period, some results have not been displayed. A complete set of results can be found in Results Review.

## 2017-06-09 ENCOUNTER — HOSPITAL ENCOUNTER (OUTPATIENT)
Dept: GENERAL RADIOLOGY | Facility: HOSPITAL | Age: 47
Discharge: HOME | End: 2017-06-09
Admitting: NURSE PRACTITIONER

## 2017-06-09 PROCEDURE — 74220 X-RAY XM ESOPHAGUS 1CNTRST: CPT

## 2017-06-09 RX ADMIN — BARIUM SULFATE 100 ML: 960 POWDER, FOR SUSPENSION ORAL at 09:00

## 2017-06-13 DIAGNOSIS — K44.9 HIATAL HERNIA: Primary | ICD-10-CM

## 2017-06-28 ENCOUNTER — OFFICE VISIT (OUTPATIENT)
Dept: SURGERY | Facility: CLINIC | Age: 47
End: 2017-06-28

## 2017-06-28 VITALS
BODY MASS INDEX: 33.18 KG/M2 | DIASTOLIC BLOOD PRESSURE: 70 MMHG | SYSTOLIC BLOOD PRESSURE: 128 MMHG | WEIGHT: 224 LBS | HEIGHT: 69 IN

## 2017-06-28 DIAGNOSIS — K21.00 GASTROESOPHAGEAL REFLUX DISEASE WITH ESOPHAGITIS: Primary | ICD-10-CM

## 2017-06-28 PROCEDURE — 99212 OFFICE O/P EST SF 10 MIN: CPT | Performed by: SURGERY

## 2017-06-28 RX ORDER — SODIUM CHLORIDE, SODIUM LACTATE, POTASSIUM CHLORIDE, CALCIUM CHLORIDE 600; 310; 30; 20 MG/100ML; MG/100ML; MG/100ML; MG/100ML
100 INJECTION, SOLUTION INTRAVENOUS CONTINUOUS
Status: CANCELLED | OUTPATIENT
Start: 2017-08-17

## 2017-06-28 RX ORDER — SODIUM CHLORIDE 0.9 % (FLUSH) 0.9 %
1-10 SYRINGE (ML) INJECTION AS NEEDED
Status: CANCELLED | OUTPATIENT
Start: 2017-08-17

## 2017-06-28 NOTE — PROGRESS NOTES
Chief Complaint   Patient presents with   • Hernia     Hiatus Hernia.        HPI    Study Result   Barium esophagram.     HISTORY: Painful swallowing, chest pain. Dysphagia.     0.21 minutes of fluoroscopy was used. 37 images are obtained at a  film rate of 2/s.     FINDINGS: Barium, gas crystals, and high density barium were  used.     There is normal initiation of the swallowing mechanism. There is  normal antegrade esophageal peristalsis. There are intermittent  transverse striations in the mid esophagus which may be seen with  esophagitis. There is intermittent small sliding hiatus hernia.  Normal gastroesophageal junction. Only a small amount of reflux  is identified in the supine position.     IMPRESSION:  CONCLUSION: Small intermittent sliding hiatus hernia.     Transverse esophageal striations often seen in patient with  esophagitis.     Small amount of spontaneous gastroesophageal reflux up to the  lower thoracic esophagus observed in the supine position.     Electronically signed by: Shaq Hoang MD 6/9/2017 9:50 AM CDT  Workstation: [id number]       Past Medical History:   Diagnosis Date   • Acquired hypothyroidism    • Acute bronchitis    • Acute maxillary sinusitis    • Acute otitis media    • Acute pharyngitis    • Acute wheezy bronchitis    • Contracture of ankle    • Deformity of calcaneus    • Iron deficiency anemia    • Malaise and fatigue    • Obesity     bmi 31   • Peroneal tendinitis    • Preoperative examination     surgical clearance   • Surgical followup    • TMJ (temporomandibular joint disorder)        Past Surgical History:   Procedure Laterality Date   • COLONOSCOPY  03/15/2013    Internal and external hemorrhoids found.   • DEBRIDEMENT LEG  12/04/2014    Debridement and repair of peroneal tendon as well as resection of peroneal tubercle right   • ENDOSCOPY  03/15/2013    esophagitis seen. Biopsy taken. A hiatus hernia found in the stomach. Gastritis found in the stomach. Biopsy taken.  Normal duodenum.   • ENDOSCOPY  06/16/2011    esophagitis seen. Biopsy taken. A hiatus hernia found in the stomach. Gastritis found in the stomach. Biopsy taken. Normal duodenum.   • ENDOSCOPY N/A 5/26/2017    Procedure: ESOPHAGOGASTRODUODENOSCOPY possible dilation ;  Surgeon: Murali Brower MD;  Location: [facility] ENDOSCOPY;  Service:    • ENTEROSCOPY SMALL BOWEL  03/15/2013    Capsule took greater then 3 hours to pass through stomach. Small bowel appeared normal. Although prp was robert.   • INJECTION OF MEDICATION  09/22/2013    Dexamethasone   • INJECTION OF MEDICATION  10/10/2015    Kenalog   • INJECTION OF MEDICATION  10/10/2015    Rocephin   • JOINT/ SOFT TISSUE INJECTION CONVERTED  09/22/2013    Small Joint Injection/Aspiration          Current Outpatient Prescriptions:   •  dexlansoprazole (DEXILANT) 60 MG capsule, Take 1 capsule by mouth Daily for 30 days., Disp: 30 capsule, Rfl: 3  •  ferrous sulfate (FERROUSUL) 325 (65 FE) MG tablet, Take 1 tablet by mouth Daily With Breakfast., Disp: 30 tablet, Rfl: 0  •  levothyroxine (SYNTHROID) 125 MCG tablet, Take 1 tablet by mouth Daily., Disp: 30 tablet, Rfl: 11  •  sucralfate (CARAFATE) 1 G tablet, Take 1 tablet by mouth 4 (Four) Times a Day., Disp: 120 tablet, Rfl: 3  •  testosterone (ANDRODERM) 2 MG/24HR patch 24 hour 24 hour patch, Place 1 patch on the skin Every Night., Disp: 30 patch, Rfl: 5    No Known Allergies    Family History   Problem Relation Age of Onset   • Diabetes Other    • Heart attack Father        Social History     Social History   • Marital status:      Spouse name: N/A   • Number of children: N/A   • Years of education: N/A     Occupational History   • Not on file.     Social History Main Topics   • Smoking status: Former Smoker   • Smokeless tobacco: Never Used   • Alcohol use No   • Drug use: Not on file   • Sexual activity: Not on file     Other Topics Concern   • Not on file     Social History Narrative       Review of  Systems  Nothing to add  Physical Exam   Constitutional: He appears well-developed and well-nourished.   Neck: Normal range of motion. Neck supple. No JVD present. No tracheal deviation present. No thyromegaly present.   Cardiovascular: Normal rate and regular rhythm.    Pulmonary/Chest: Effort normal and breath sounds normal. No stridor.   Abdominal: Soft. Bowel sounds are normal.   Lymphadenopathy:     He has no cervical adenopathy.         ASSESSMENT    Jonathon was seen today for hernia.    Diagnoses and all orders for this visit:    Gastroesophageal reflux disease with esophagitis  -     CBC and Differential; Future  -     Comprehensive metabolic panel; Future  -     Type and screen; Future  -     sodium chloride 0.9 % flush 1-10 mL; Infuse 1-10 mL into a venous catheter As Needed for Line Care.  -     lactated ringers infusion; Infuse 100 mL/hr into a venous catheter Continuous.  -     ceFAZolin (ANCEF) 2 g in sodium chloride 0.9 % 100 mL IVPB; Infuse 2 g into a venous catheter 1 (One) Time.  -     Case Request; Standing  -     Case Request    Other orders  -     Outpatient In A Bed; Standing  -     Provide instructions to patient on NPO status  -     Follow anesthesia standing orders.; Standing  -     Insert Peripheral IV; Standing  -     Saline Lock & Maintain IV Access; Standing  -     Follow anesthesia standing orders.  -     Obtain informed consent; Standing  -     LASHAWN hose- To be placed on patient in pre-op; Standing  -     SCD (sequential compression device)- to be placed on patient in Pre-op; Standing  -     Clip operative site; Standing      PLAN    1. Laparoscopic possible open Nissen fundoplication    The procedure is explained to the patient.  A booklet with illustrations is provided.    The risks of bleeding, infection, injury to surrounding organs (such as spleen, liver, stomach, small bowel, colon, and esophagus) are explained.    The risk of recurrent disease because of Nissen failure or  recurrent hiatal hernia is explained.  It is explained to the patient that the Nissen wrap may lead to dysphagia and pain with swallowing.  While most of these are temporary problems, the possible need for dilatation is mentioned.  The benefits of surgery include possibility of discontinuing all anti-reflux medications.  It is explained to the patient that if Lewis's esophagus is present, Nissen fundoplication will not decrease the cancer risk and conscientious annual surveillance will still be necessary.  It is explained that the transfusion risk is low.  Alternatives include addition therapy, loose clothing, and continued proton pump inhibitors.  It is also explained that weight loss in and of itself may improve symptoms.  After consideration, the patient desires to proceed with operation.            This document has been electronically signed by Ozzie Barger MD on June 28, 2017 11:50 AM

## 2017-07-07 ENCOUNTER — HOSPITAL ENCOUNTER (EMERGENCY)
Facility: HOSPITAL | Age: 47
Discharge: HOME | End: 2017-07-07
Attending: EMERGENCY MEDICINE | Admitting: EMERGENCY MEDICINE

## 2017-07-07 ENCOUNTER — APPOINTMENT (OUTPATIENT)
Dept: GENERAL RADIOLOGY | Facility: HOSPITAL | Age: 47
End: 2017-07-07

## 2017-07-07 VITALS
DIASTOLIC BLOOD PRESSURE: 74 MMHG | HEIGHT: 69 IN | WEIGHT: 215 LBS | HEART RATE: 69 BPM | SYSTOLIC BLOOD PRESSURE: 157 MMHG | OXYGEN SATURATION: 97 % | BODY MASS INDEX: 31.84 KG/M2 | RESPIRATION RATE: 18 BRPM | TEMPERATURE: 98.1 F

## 2017-07-07 DIAGNOSIS — S99.921A RIGHT FOOT INJURY, INITIAL ENCOUNTER: ICD-10-CM
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
## 2017-07-07 PROCEDURE — 99283 EMERGENCY DEPT VISIT LOW MDM: CPT

## 2017-07-07 PROCEDURE — 73630 X-RAY EXAM OF FOOT: CPT

## 2017-07-07 RX ORDER — IBUPROFEN 600 MG/1
600 TABLET ORAL ONCE
Status: DISCONTINUED | OUTPATIENT
Start: 2017-07-07 | End: 2017-07-07 | Stop reason: HOSPADM

## 2017-07-07 RX ORDER — IBUPROFEN 600 MG/1
600 TABLET ORAL EVERY 8 HOURS PRN
Qty: 20 TABLET | Refills: 0 | Status: SHIPPED | OUTPATIENT
Start: 2017-07-07 | End: 2017-07-14

## 2017-07-07 NOTE — ED PROVIDER NOTES
Subjective   HPI Comments: Patient was coming out of the pool wet and slipt on the concrete floor and hurt his right food.  He fell he was shows a contusion and when to sleep but he kept throbbing hurting so he decided to come to be checked    No allergies    Family history of diabetes    Medical history hiatal hernia    Surgeries December 2015 have lateral peritoneal tendon repair    Patient is a 47 y.o. male presenting with fall.   History provided by:  Patient  Fall   Mechanism of injury: fall    Injury location:  Foot  Foot injury location:  R foot  Incident location:  Home  Time since incident:  6 hours  Arrived directly from scene: no    Fall:     Fall occurred:  Walking    Height of fall:  3 feet    Impact surface:  Hard floor    Point of impact:  Unable to specify    Entrapped after fall: no    Protective equipment: none    Suspicion of alcohol use: no    Suspicion of drug use: no    Tetanus status:  Unknown  Prior to arrival data:     Patient ambulatory at scene: yes      Blood loss:  None    Responsiveness at scene:  Alert    Orientation at scene:  Person, place, situation and time    Loss of consciousness: no      Amnesic to event: no      Breathing interventions:  None    IV access status:  None    IO access:  None    Fluids administered:  None    Medications administered:  None    Immobilization:  None  Associated symptoms: no abdominal pain, no back pain, no chest pain, no headaches, no nausea, no neck pain, no seizures and no vomiting    Risk factors: no CHF, no COPD, no diabetes and no kidney disease        Review of Systems   Constitutional: Negative for activity change, appetite change, fatigue and fever.   HENT: Negative for congestion, facial swelling, mouth sores, nosebleeds, sore throat and trouble swallowing.    Eyes: Negative for discharge, redness and itching.   Respiratory: Negative for apnea, cough and wheezing.    Cardiovascular: Negative for chest pain and palpitations.    Gastrointestinal: Negative for abdominal pain, blood in stool, nausea and vomiting.   Endocrine: Negative for cold intolerance, heat intolerance, polydipsia, polyphagia and polyuria.   Genitourinary: Negative for difficulty urinating, dysuria, flank pain, frequency and hematuria.   Musculoskeletal: Positive for gait problem. Negative for back pain, joint swelling and neck pain.        Right foot pain dorsal area   Skin: Negative.  Negative for color change, pallor and rash.   Allergic/Immunologic: Negative for environmental allergies.   Neurological: Negative for dizziness, seizures, syncope, speech difficulty, light-headedness, numbness and headaches.   Hematological: Negative for adenopathy.   Psychiatric/Behavioral: Negative for agitation, behavioral problems, confusion and sleep disturbance. The patient is not nervous/anxious.        Past Medical History:   Diagnosis Date   • Acquired hypothyroidism    • Acute bronchitis    • Acute maxillary sinusitis    • Acute otitis media    • Acute pharyngitis    • Acute wheezy bronchitis    • Contracture of ankle    • Deformity of calcaneus    • Iron deficiency anemia    • Malaise and fatigue    • Obesity     bmi 31   • Peroneal tendinitis    • Preoperative examination     surgical clearance   • Surgical followup    • TMJ (temporomandibular joint disorder)        No Known Allergies    Past Surgical History:   Procedure Laterality Date   • COLONOSCOPY  03/15/2013    Internal and external hemorrhoids found.   • DEBRIDEMENT LEG  12/04/2014    Debridement and repair of peroneal tendon as well as resection of peroneal tubercle right   • ENDOSCOPY  03/15/2013    esophagitis seen. Biopsy taken. A hiatus hernia found in the stomach. Gastritis found in the stomach. Biopsy taken. Normal duodenum.   • ENDOSCOPY  06/16/2011    esophagitis seen. Biopsy taken. A hiatus hernia found in the stomach. Gastritis found in the stomach. Biopsy taken. Normal duodenum.   • ENDOSCOPY N/A  5/26/2017    Procedure: ESOPHAGOGASTRODUODENOSCOPY possible dilation ;  Surgeon: Murali Brower MD;  Location: [facility] ENDOSCOPY;  Service:    • ENTEROSCOPY SMALL BOWEL  03/15/2013    Capsule took greater then 3 hours to pass through stomach. Small bowel appeared normal. Although prp was robert.   • INJECTION OF MEDICATION  09/22/2013    Dexamethasone   • INJECTION OF MEDICATION  10/10/2015    Kenalog   • INJECTION OF MEDICATION  10/10/2015    Rocephin   • JOINT/ SOFT TISSUE INJECTION CONVERTED  09/22/2013    Small Joint Injection/Aspiration        Family History   Problem Relation Age of Onset   • Diabetes Other    • Heart attack Father        Social History     Social History   • Marital status:      Spouse name: N/A   • Number of children: N/A   • Years of education: N/A     Social History Main Topics   • Smoking status: Former Smoker   • Smokeless tobacco: Never Used   • Alcohol use No   • Drug use: No   • Sexual activity: Defer     Other Topics Concern   • None     Social History Narrative   • None           Objective   Physical Exam   Constitutional: He is oriented to person, place, and time. He appears well-developed and well-nourished.   HENT:   Head: Normocephalic and atraumatic.   Nose: Nose normal.   Mouth/Throat: Oropharynx is clear and moist.   Eyes: Conjunctivae and EOM are normal. Pupils are equal, round, and reactive to light.   Neck: Normal range of motion. Neck supple.   Cardiovascular: Normal rate, regular rhythm, normal heart sounds and intact distal pulses.    Pulmonary/Chest: Effort normal and breath sounds normal.   Abdominal: Soft. Bowel sounds are normal.   Musculoskeletal: Normal range of motion.        Neurological: He is alert and oriented to person, place, and time.   Skin: Skin is warm and dry.   Psychiatric: He has a normal mood and affect. His behavior is normal. Judgment and thought content normal.   Nursing note and vitals reviewed.      Procedures         ED Course  ED Course       Labs Reviewed - No data to display     XR Foot 3+ View Right   Final Result   No acute bony abnormality.      Electronically signed by:  Bay Rayo  7/7/2017 3:04 AM CDT   Workstation: [id number]                      [facility]    Final diagnoses:   Fall, initial encounter   Right foot injury, initial encounter            Raulito Vizcarra MD  07/07/17 0334

## 2017-07-07 NOTE — ED TRIAGE NOTES
Pt presents to ED with c/o rt foot pain, pt stated his floor was wet when he slipped and fell hurting his rt foot.

## 2017-08-14 ENCOUNTER — OFFICE VISIT (OUTPATIENT)
Dept: SURGERY | Facility: CLINIC | Age: 47
End: 2017-08-14

## 2017-08-14 ENCOUNTER — APPOINTMENT (OUTPATIENT)
Dept: PREADMISSION TESTING | Facility: HOSPITAL | Age: 47
End: 2017-08-14

## 2017-08-14 VITALS
OXYGEN SATURATION: 98 % | HEIGHT: 69 IN | RESPIRATION RATE: 16 BRPM | DIASTOLIC BLOOD PRESSURE: 76 MMHG | WEIGHT: 217 LBS | SYSTOLIC BLOOD PRESSURE: 120 MMHG | HEART RATE: 77 BPM | BODY MASS INDEX: 32.14 KG/M2

## 2017-08-14 VITALS
WEIGHT: 221 LBS | DIASTOLIC BLOOD PRESSURE: 80 MMHG | BODY MASS INDEX: 32.73 KG/M2 | HEIGHT: 69 IN | SYSTOLIC BLOOD PRESSURE: 118 MMHG

## 2017-08-14 DIAGNOSIS — K21.00 GASTROESOPHAGEAL REFLUX DISEASE WITH ESOPHAGITIS: ICD-10-CM

## 2017-08-14 DIAGNOSIS — K21.9 GASTROESOPHAGEAL REFLUX DISEASE, ESOPHAGITIS PRESENCE NOT SPECIFIED: Primary | ICD-10-CM

## 2017-08-14 LAB
ABO GROUP BLD: NORMAL
ALBUMIN SERPL-MCNC: 3.9 G/DL (ref 3.4–4.8)
ALBUMIN/GLOB SERPL: 1.6 G/DL (ref 1.1–1.8)
ALP SERPL-CCNC: 54 U/L (ref 38–126)
ALT SERPL W P-5'-P-CCNC: 46 U/L (ref 21–72)
ANION GAP SERPL CALCULATED.3IONS-SCNC: 9 MMOL/L (ref 5–15)
AST SERPL-CCNC: 23 U/L (ref 17–59)
BASOPHILS # BLD AUTO: 0.01 10*3/MM3 (ref 0–0.2)
BASOPHILS NFR BLD AUTO: 0.2 % (ref 0–2)
BILIRUB SERPL-MCNC: 0.5 MG/DL (ref 0.2–1.3)
BLD GP AB SCN SERPL QL: NEGATIVE
BUN BLD-MCNC: 13 MG/DL (ref 7–21)
BUN/CREAT SERPL: 13.8 (ref 7–25)
CALCIUM SPEC-SCNC: 9.5 MG/DL (ref 8.4–10.2)
CHLORIDE SERPL-SCNC: 104 MMOL/L (ref 95–110)
CO2 SERPL-SCNC: 27 MMOL/L (ref 22–31)
CREAT BLD-MCNC: 0.94 MG/DL (ref 0.7–1.3)
DEPRECATED RDW RBC AUTO: 41.9 FL (ref 35.1–43.9)
EOSINOPHIL # BLD AUTO: 0.21 10*3/MM3 (ref 0–0.7)
EOSINOPHIL NFR BLD AUTO: 3.8 % (ref 0–7)
ERYTHROCYTE [DISTWIDTH] IN BLOOD BY AUTOMATED COUNT: 12.7 % (ref 11.5–14.5)
GFR SERPL CREATININE-BSD FRML MDRD: 86 ML/MIN/1.73 (ref 60–147)
GLOBULIN UR ELPH-MCNC: 2.4 GM/DL (ref 2.3–3.5)
GLUCOSE BLD-MCNC: 104 MG/DL (ref 60–100)
HCT VFR BLD AUTO: 40.8 % (ref 39–49)
HGB BLD-MCNC: 14.1 G/DL (ref 13.7–17.3)
IMM GRANULOCYTES # BLD: 0.01 10*3/MM3 (ref 0–0.02)
IMM GRANULOCYTES NFR BLD: 0.2 % (ref 0–0.5)
LYMPHOCYTES # BLD AUTO: 1.12 10*3/MM3 (ref 0.6–4.2)
LYMPHOCYTES NFR BLD AUTO: 20.2 % (ref 10–50)
Lab: NORMAL
MCH RBC QN AUTO: 31.3 PG (ref 26.5–34)
MCHC RBC AUTO-ENTMCNC: 34.6 G/DL (ref 31.5–36.3)
MCV RBC AUTO: 90.7 FL (ref 80–98)
MONOCYTES # BLD AUTO: 0.5 10*3/MM3 (ref 0–0.9)
MONOCYTES NFR BLD AUTO: 9 % (ref 0–12)
NEUTROPHILS # BLD AUTO: 3.69 10*3/MM3 (ref 2–8.6)
NEUTROPHILS NFR BLD AUTO: 66.6 % (ref 37–80)
PLATELET # BLD AUTO: 184 10*3/MM3 (ref 150–450)
PMV BLD AUTO: 10 FL (ref 8–12)
POTASSIUM BLD-SCNC: 4.3 MMOL/L (ref 3.5–5.1)
PROT SERPL-MCNC: 6.3 G/DL (ref 6.3–8.6)
RBC # BLD AUTO: 4.5 10*6/MM3 (ref 4.37–5.74)
RH BLD: POSITIVE
SODIUM BLD-SCNC: 140 MMOL/L (ref 137–145)
WBC NRBC COR # BLD: 5.54 10*3/MM3 (ref 3.2–9.8)

## 2017-08-14 PROCEDURE — 99213 OFFICE O/P EST LOW 20 MIN: CPT | Performed by: SURGERY

## 2017-08-14 PROCEDURE — 36415 COLL VENOUS BLD VENIPUNCTURE: CPT

## 2017-08-14 PROCEDURE — 80053 COMPREHEN METABOLIC PANEL: CPT | Performed by: SURGERY

## 2017-08-14 PROCEDURE — 86900 BLOOD TYPING SEROLOGIC ABO: CPT | Performed by: SURGERY

## 2017-08-14 PROCEDURE — 86901 BLOOD TYPING SEROLOGIC RH(D): CPT | Performed by: SURGERY

## 2017-08-14 PROCEDURE — 85025 COMPLETE CBC W/AUTO DIFF WBC: CPT | Performed by: SURGERY

## 2017-08-14 PROCEDURE — 86850 RBC ANTIBODY SCREEN: CPT | Performed by: SURGERY

## 2017-08-14 RX ORDER — FERROUS SULFATE 325(65) MG
325 TABLET ORAL
COMMUNITY
End: 2017-11-29

## 2017-08-14 NOTE — DISCHARGE INSTRUCTIONS
[facility]  Pre-op Information and Guidelines    You will be called after 2 p.m. the day before your surgery (Friday for Monday surgery) and notified of your time for arrival and approximate surgery time.  If you have not received a call by 4P.M., please contact Same Day Surgery at [phone] of if outside [facility] call [phone].    Please Follow these Important Safety Guidelines:    • The morning of your procedure, take only the medications listed below with   A sip of water:_____________________________________________       ____SYNTHROID_____________________________    • DO NOT eat or drink anything after 12:00 midnight the night before surgery  Specific instructions concerning drinking clear liquids will be discussed during  the pre-surgery instruction call the day before your surgery.    • If you take a blood thinner (ex. Plavix, Coumadin, aspirin), ask your doctor when to stop it before surgery  STOP DATE: _________________    • Only 2 visitors are allowed in patient rooms at a time  Your visitors will be asked to wait in the lobby until the admission process is complete with the exception of a parent with a child and patients in need of special assistance.    • YOU CANNOT DRIVE YOURSELF HOME  You must be accompanied by someone who will be responsible for driving you home after surgery and for your care at home.    • DO NOT chew gum, use breath mints, hard candy, or smoke the day of surgery  • DO NOT drink alcohol for at least 24 hours before your surgery  • DO NOT wear any jewelry and remove all body piercing before coming to the hospital  • DO NOT wear make-up to the hospital  • If you are having surgery on an extremity (arm/leg/foot) remove nail polish/artificial nails on the surgical side  • Clothing, glasses, contacts, dentures, and hairpieces must be removed before surgery  • Bathe the night before or the morning of your surgery and do not use powders/lotions on  skin.

## 2017-08-14 NOTE — PROGRESS NOTES
Chief Complaint   Patient presents with   • Follow-up     Recheck hiatus hernia.        HPI  Here for per- op visit before lap Nissen. Small hiatal hernia noted. Reflux remains sporadic with severe episodes noted as well.  Past Medical History:   Diagnosis Date   • Acquired hypothyroidism    • Contracture of ankle    • Deformity of calcaneus    • GERD (gastroesophageal reflux disease)    • Hiatal hernia    • Iron deficiency anemia    • Malaise and fatigue    • Obesity     bmi 31   • Peroneal tendinitis    • TMJ (temporomandibular joint disorder)        Past Surgical History:   Procedure Laterality Date   • COLONOSCOPY  03/15/2013    Internal and external hemorrhoids found.   • DEBRIDEMENT LEG  12/04/2014    Debridement and repair of peroneal tendon as well as resection of peroneal tubercle right   • ENDOSCOPY  03/15/2013    esophagitis seen. Biopsy taken. A hiatus hernia found in the stomach. Gastritis found in the stomach. Biopsy taken. Normal duodenum.   • ENDOSCOPY  06/16/2011    esophagitis seen. Biopsy taken. A hiatus hernia found in the stomach. Gastritis found in the stomach. Biopsy taken. Normal duodenum.   • ENDOSCOPY N/A 5/26/2017    Procedure: ESOPHAGOGASTRODUODENOSCOPY possible dilation ;  Surgeon: Murali Brower MD;  Location: [facility] ENDOSCOPY;  Service:    • ENTEROSCOPY SMALL BOWEL  03/15/2013    Capsule took greater then 3 hours to pass through stomach. Small bowel appeared normal. Although prp was robret.   • LIPOSUCTION ABDOMINAL  2008         Current Outpatient Prescriptions:   •  ferrous sulfate 325 (65 FE) MG tablet, Take 325 mg by mouth Daily With Breakfast., Disp: , Rfl:   •  levothyroxine (SYNTHROID) 125 MCG tablet, Take 1 tablet by mouth Daily., Disp: 30 tablet, Rfl: 11  •  sucralfate (CARAFATE) 1 G tablet, Take 1 tablet by mouth 4 (Four) Times a Day., Disp: 120 tablet, Rfl: 3  •  testosterone (ANDRODERM) 2 MG/24HR patch 24 hour 24 hour patch, Place 1 patch on the skin Every Night., Disp: 30  patch, Rfl: 5    No Known Allergies    Family History   Problem Relation Age of Onset   • Diabetes Other    • Heart attack Father        Social History     Social History   • Marital status:      Social History Main Topics   • Smoking status: Former Smoker     Years: 14.00     Quit date: 2000   • Smokeless tobacco: Never Used   • Alcohol use Yes      Comment: seldom   • Drug use: No       Review of Systems  Nothing to add- no dysphagia  Physical Exam   Cardiovascular: Regular rhythm.    Pulmonary/Chest: Breath sounds normal. He has no decreased breath sounds.   Abdominal: Soft. Normal appearance and bowel sounds are normal. There is no tenderness.         ASSESSMENT    Jonathon was seen today for follow-up.    Diagnoses and all orders for this visit:    Gastroesophageal reflux disease, esophagitis presence not specified      PLAN    1. Laparoscopic possible open Nissen fundoplication.    The procedure is explained to the patient.  A booklet with illustrations is provided.    The risks of bleeding, infection, injury to surrounding organs (such as spleen, liver, stomach, small bowel, colon, and esophagus) are explained.    The risk of recurrent disease because of Nissen failure or recurrent hiatal hernia is explained.  It is explained to the patient that the Nissen wrap may lead to dysphagia and pain with swallowing.  While most of these are temporary problems, the possible need for dilatation is mentioned.  The benefits of surgery include possibility of discontinuing all anti-reflux medications.  It is explained to the patient that if Lewis's esophagus is present, Nissen fundoplication will not decrease the cancer risk and conscientious annual surveillance will still be necessary.  It is explained that the transfusion risk is low.  Alternatives include addition therapy, loose clothing, and continued proton pump inhibitors.  It is also explained that weight loss in and of itself may improve symptoms.  After  consideration, the patient desires to proceed with operation.            This document has been electronically signed by Ozzie Barger MD on August 14, 2017 10:45 AM

## 2017-08-17 ENCOUNTER — HOSPITAL ENCOUNTER (OUTPATIENT)
Facility: HOSPITAL | Age: 47
Discharge: HOME | End: 2017-08-19
Attending: SURGERY | Admitting: SURGERY

## 2017-08-17 ENCOUNTER — ANESTHESIA (OUTPATIENT)
Dept: PERIOP | Facility: HOSPITAL | Age: 47
End: 2017-08-17

## 2017-08-17 ENCOUNTER — ANESTHESIA EVENT (OUTPATIENT)
Dept: PERIOP | Facility: HOSPITAL | Age: 47
End: 2017-08-17

## 2017-08-17 DIAGNOSIS — K21.00 GASTROESOPHAGEAL REFLUX DISEASE WITH ESOPHAGITIS: ICD-10-CM

## 2017-08-17 PROBLEM — K21.9 GERD (GASTROESOPHAGEAL REFLUX DISEASE): Status: ACTIVE | Noted: 2017-08-17

## 2017-08-17 LAB
ABO GROUP BLD: NORMAL
BLD GP AB SCN SERPL QL: NEGATIVE
Lab: NORMAL
RH BLD: POSITIVE

## 2017-08-17 PROCEDURE — 86901 BLOOD TYPING SEROLOGIC RH(D): CPT | Performed by: ANESTHESIOLOGY

## 2017-08-17 PROCEDURE — 25010000002 KETOROLAC TROMETHAMINE PER 15 MG: Performed by: SURGERY

## 2017-08-17 PROCEDURE — 25010000002 HYDROMORPHONE PER 4 MG: Performed by: NURSE ANESTHETIST, CERTIFIED REGISTERED

## 2017-08-17 PROCEDURE — 25010000002 KETOROLAC TROMETHAMINE PER 15 MG: Performed by: NURSE ANESTHETIST, CERTIFIED REGISTERED

## 2017-08-17 PROCEDURE — 25010000002 METOCLOPRAMIDE PER 10 MG: Performed by: NURSE ANESTHETIST, CERTIFIED REGISTERED

## 2017-08-17 PROCEDURE — 86850 RBC ANTIBODY SCREEN: CPT | Performed by: ANESTHESIOLOGY

## 2017-08-17 PROCEDURE — 25010000003 CEFAZOLIN PER 500 MG: Performed by: SURGERY

## 2017-08-17 PROCEDURE — 25010000002 ONDANSETRON PER 1 MG: Performed by: NURSE ANESTHETIST, CERTIFIED REGISTERED

## 2017-08-17 PROCEDURE — 25010000002 DEXAMETHASONE PER 1 MG: Performed by: NURSE ANESTHETIST, CERTIFIED REGISTERED

## 2017-08-17 PROCEDURE — 25010000002 NEOSTIGMINE 10 MG/10ML SOLUTION

## 2017-08-17 PROCEDURE — 86900 BLOOD TYPING SEROLOGIC ABO: CPT | Performed by: ANESTHESIOLOGY

## 2017-08-17 PROCEDURE — 25010000002 MIDAZOLAM PER 1 MG: Performed by: NURSE ANESTHETIST, CERTIFIED REGISTERED

## 2017-08-17 PROCEDURE — 43280 LAPAROSCOPY FUNDOPLASTY: CPT | Performed by: SPECIALIST/TECHNOLOGIST, OTHER

## 2017-08-17 PROCEDURE — 94799 UNLISTED PULMONARY SVC/PX: CPT

## 2017-08-17 PROCEDURE — 25010000002 FENTANYL CITRATE (PF) 100 MCG/2ML SOLUTION: Performed by: NURSE ANESTHETIST, CERTIFIED REGISTERED

## 2017-08-17 PROCEDURE — 25010000002 PROPOFOL 10 MG/ML EMULSION: Performed by: NURSE ANESTHETIST, CERTIFIED REGISTERED

## 2017-08-17 PROCEDURE — 43280 LAPAROSCOPY FUNDOPLASTY: CPT | Performed by: SURGERY

## 2017-08-17 PROCEDURE — 94760 N-INVAS EAR/PLS OXIMETRY 1: CPT

## 2017-08-17 RX ORDER — PROPOFOL 10 MG/ML
VIAL (ML) INTRAVENOUS AS NEEDED
Status: DISCONTINUED | OUTPATIENT
Start: 2017-08-17 | End: 2017-08-17 | Stop reason: SURG

## 2017-08-17 RX ORDER — ACETAMINOPHEN 325 MG/1
650 TABLET ORAL ONCE AS NEEDED
Status: DISCONTINUED | OUTPATIENT
Start: 2017-08-17 | End: 2017-08-17 | Stop reason: HOSPADM

## 2017-08-17 RX ORDER — KETOROLAC TROMETHAMINE 30 MG/ML
15 INJECTION, SOLUTION INTRAMUSCULAR; INTRAVENOUS EVERY 6 HOURS
Status: COMPLETED | OUTPATIENT
Start: 2017-08-17 | End: 2017-08-18

## 2017-08-17 RX ORDER — METOCLOPRAMIDE HYDROCHLORIDE 5 MG/ML
INJECTION INTRAMUSCULAR; INTRAVENOUS AS NEEDED
Status: DISCONTINUED | OUTPATIENT
Start: 2017-08-17 | End: 2017-08-17 | Stop reason: SURG

## 2017-08-17 RX ORDER — ROCURONIUM BROMIDE 10 MG/ML
INJECTION, SOLUTION INTRAVENOUS AS NEEDED
Status: DISCONTINUED | OUTPATIENT
Start: 2017-08-17 | End: 2017-08-17 | Stop reason: SURG

## 2017-08-17 RX ORDER — ACETAMINOPHEN 650 MG/1
650 SUPPOSITORY RECTAL ONCE AS NEEDED
Status: DISCONTINUED | OUTPATIENT
Start: 2017-08-17 | End: 2017-08-17 | Stop reason: HOSPADM

## 2017-08-17 RX ORDER — HYDROMORPHONE HCL 110MG/55ML
PATIENT CONTROLLED ANALGESIA SYRINGE INTRAVENOUS AS NEEDED
Status: DISCONTINUED | OUTPATIENT
Start: 2017-08-17 | End: 2017-08-17 | Stop reason: SURG

## 2017-08-17 RX ORDER — SODIUM CHLORIDE, SODIUM LACTATE, POTASSIUM CHLORIDE, CALCIUM CHLORIDE 600; 310; 30; 20 MG/100ML; MG/100ML; MG/100ML; MG/100ML
100 INJECTION, SOLUTION INTRAVENOUS CONTINUOUS
Status: DISCONTINUED | OUTPATIENT
Start: 2017-08-17 | End: 2017-08-17 | Stop reason: HOSPADM

## 2017-08-17 RX ORDER — BUPIVACAINE HYDROCHLORIDE AND EPINEPHRINE 5; 5 MG/ML; UG/ML
INJECTION, SOLUTION EPIDURAL; INTRACAUDAL; PERINEURAL AS NEEDED
Status: DISCONTINUED | OUTPATIENT
Start: 2017-08-17 | End: 2017-08-19 | Stop reason: HOSPADM

## 2017-08-17 RX ORDER — FLUMAZENIL 0.1 MG/ML
0.2 INJECTION INTRAVENOUS AS NEEDED
Status: DISCONTINUED | OUTPATIENT
Start: 2017-08-17 | End: 2017-08-17 | Stop reason: HOSPADM

## 2017-08-17 RX ORDER — ONDANSETRON 2 MG/ML
INJECTION INTRAMUSCULAR; INTRAVENOUS AS NEEDED
Status: DISCONTINUED | OUTPATIENT
Start: 2017-08-17 | End: 2017-08-17 | Stop reason: SURG

## 2017-08-17 RX ORDER — DEXAMETHASONE SODIUM PHOSPHATE 4 MG/ML
INJECTION, SOLUTION INTRA-ARTICULAR; INTRALESIONAL; INTRAMUSCULAR; INTRAVENOUS; SOFT TISSUE AS NEEDED
Status: DISCONTINUED | OUTPATIENT
Start: 2017-08-17 | End: 2017-08-17 | Stop reason: SURG

## 2017-08-17 RX ORDER — SODIUM CHLORIDE, SODIUM GLUCONATE, SODIUM ACETATE, POTASSIUM CHLORIDE, AND MAGNESIUM CHLORIDE 526; 502; 368; 37; 30 MG/100ML; MG/100ML; MG/100ML; MG/100ML; MG/100ML
100 INJECTION, SOLUTION INTRAVENOUS CONTINUOUS
Status: ACTIVE | OUTPATIENT
Start: 2017-08-17 | End: 2017-08-17

## 2017-08-17 RX ORDER — KETOROLAC TROMETHAMINE 30 MG/ML
INJECTION, SOLUTION INTRAMUSCULAR; INTRAVENOUS AS NEEDED
Status: DISCONTINUED | OUTPATIENT
Start: 2017-08-17 | End: 2017-08-17 | Stop reason: SURG

## 2017-08-17 RX ORDER — ONDANSETRON 2 MG/ML
4 INJECTION INTRAMUSCULAR; INTRAVENOUS ONCE AS NEEDED
Status: DISCONTINUED | OUTPATIENT
Start: 2017-08-17 | End: 2017-08-17 | Stop reason: HOSPADM

## 2017-08-17 RX ORDER — SODIUM CHLORIDE, SODIUM GLUCONATE, SODIUM ACETATE, POTASSIUM CHLORIDE, AND MAGNESIUM CHLORIDE 526; 502; 368; 37; 30 MG/100ML; MG/100ML; MG/100ML; MG/100ML; MG/100ML
INJECTION, SOLUTION INTRAVENOUS CONTINUOUS PRN
Status: DISCONTINUED | OUTPATIENT
Start: 2017-08-17 | End: 2017-08-17 | Stop reason: SURG

## 2017-08-17 RX ORDER — LEVOTHYROXINE SODIUM 0.12 MG/1
125 TABLET ORAL DAILY
Status: DISCONTINUED | OUTPATIENT
Start: 2017-08-17 | End: 2017-08-19 | Stop reason: HOSPADM

## 2017-08-17 RX ORDER — LIDOCAINE HYDROCHLORIDE 20 MG/ML
INJECTION, SOLUTION INFILTRATION; PERINEURAL AS NEEDED
Status: DISCONTINUED | OUTPATIENT
Start: 2017-08-17 | End: 2017-08-17 | Stop reason: SURG

## 2017-08-17 RX ORDER — ESMOLOL HYDROCHLORIDE 10 MG/ML
INJECTION INTRAVENOUS AS NEEDED
Status: DISCONTINUED | OUTPATIENT
Start: 2017-08-17 | End: 2017-08-17 | Stop reason: SURG

## 2017-08-17 RX ORDER — EPHEDRINE SULFATE 50 MG/ML
INJECTION, SOLUTION INTRAVENOUS AS NEEDED
Status: DISCONTINUED | OUTPATIENT
Start: 2017-08-17 | End: 2017-08-17

## 2017-08-17 RX ORDER — ONDANSETRON 2 MG/ML
4 INJECTION INTRAMUSCULAR; INTRAVENOUS EVERY 6 HOURS PRN
Status: DISCONTINUED | OUTPATIENT
Start: 2017-08-17 | End: 2017-08-19 | Stop reason: HOSPADM

## 2017-08-17 RX ORDER — SODIUM CHLORIDE 0.9 % (FLUSH) 0.9 %
1-10 SYRINGE (ML) INJECTION AS NEEDED
Status: DISCONTINUED | OUTPATIENT
Start: 2017-08-17 | End: 2017-08-17 | Stop reason: HOSPADM

## 2017-08-17 RX ORDER — DIPHENHYDRAMINE HYDROCHLORIDE 50 MG/ML
12.5 INJECTION INTRAMUSCULAR; INTRAVENOUS
Status: DISCONTINUED | OUTPATIENT
Start: 2017-08-17 | End: 2017-08-17 | Stop reason: HOSPADM

## 2017-08-17 RX ORDER — LABETALOL HYDROCHLORIDE 5 MG/ML
5 INJECTION, SOLUTION INTRAVENOUS
Status: DISCONTINUED | OUTPATIENT
Start: 2017-08-17 | End: 2017-08-17 | Stop reason: HOSPADM

## 2017-08-17 RX ORDER — NALOXONE HCL 0.4 MG/ML
0.1 VIAL (ML) INJECTION
Status: DISCONTINUED | OUTPATIENT
Start: 2017-08-17 | End: 2017-08-19 | Stop reason: HOSPADM

## 2017-08-17 RX ORDER — SODIUM CHLORIDE, SODIUM LACTATE, POTASSIUM CHLORIDE, CALCIUM CHLORIDE 600; 310; 30; 20 MG/100ML; MG/100ML; MG/100ML; MG/100ML
100 INJECTION, SOLUTION INTRAVENOUS CONTINUOUS
Status: DISCONTINUED | OUTPATIENT
Start: 2017-08-17 | End: 2017-08-19 | Stop reason: HOSPADM

## 2017-08-17 RX ORDER — KETOROLAC TROMETHAMINE 15 MG/ML
15 INJECTION, SOLUTION INTRAMUSCULAR; INTRAVENOUS EVERY 6 HOURS
Status: DISCONTINUED | OUTPATIENT
Start: 2017-08-17 | End: 2017-08-17 | Stop reason: RX

## 2017-08-17 RX ORDER — EPHEDRINE SULFATE 50 MG/ML
5 INJECTION, SOLUTION INTRAVENOUS ONCE AS NEEDED
Status: DISCONTINUED | OUTPATIENT
Start: 2017-08-17 | End: 2017-08-17 | Stop reason: HOSPADM

## 2017-08-17 RX ORDER — FENTANYL CITRATE 50 UG/ML
INJECTION, SOLUTION INTRAMUSCULAR; INTRAVENOUS AS NEEDED
Status: DISCONTINUED | OUTPATIENT
Start: 2017-08-17 | End: 2017-08-17 | Stop reason: SURG

## 2017-08-17 RX ORDER — GLYCOPYRROLATE 0.2 MG/ML
INJECTION INTRAMUSCULAR; INTRAVENOUS AS NEEDED
Status: DISCONTINUED | OUTPATIENT
Start: 2017-08-17 | End: 2017-08-17 | Stop reason: SURG

## 2017-08-17 RX ORDER — MIDAZOLAM HYDROCHLORIDE 1 MG/ML
INJECTION INTRAMUSCULAR; INTRAVENOUS AS NEEDED
Status: DISCONTINUED | OUTPATIENT
Start: 2017-08-17 | End: 2017-08-17 | Stop reason: SURG

## 2017-08-17 RX ORDER — NALOXONE HCL 0.4 MG/ML
0.2 VIAL (ML) INJECTION AS NEEDED
Status: DISCONTINUED | OUTPATIENT
Start: 2017-08-17 | End: 2017-08-17 | Stop reason: HOSPADM

## 2017-08-17 RX ADMIN — MIDAZOLAM 2 MG: 1 INJECTION INTRAMUSCULAR; INTRAVENOUS at 07:20

## 2017-08-17 RX ADMIN — HYDROMORPHONE HYDROCHLORIDE 1 MG: 2 INJECTION, SOLUTION INTRAMUSCULAR; INTRAVENOUS; SUBCUTANEOUS at 10:23

## 2017-08-17 RX ADMIN — KETOROLAC TROMETHAMINE 60 MG: 30 INJECTION, SOLUTION INTRAMUSCULAR at 11:51

## 2017-08-17 RX ADMIN — ROCURONIUM BROMIDE 50 MG: 10 INJECTION INTRAVENOUS at 07:40

## 2017-08-17 RX ADMIN — FENTANYL CITRATE 50 MCG: 50 INJECTION, SOLUTION INTRAMUSCULAR; INTRAVENOUS at 11:05

## 2017-08-17 RX ADMIN — ROCURONIUM BROMIDE 10 MG: 10 INJECTION INTRAVENOUS at 11:05

## 2017-08-17 RX ADMIN — ONDANSETRON 4 MG: 2 INJECTION INTRAMUSCULAR; INTRAVENOUS at 07:25

## 2017-08-17 RX ADMIN — SODIUM CHLORIDE, SODIUM GLUCONATE, SODIUM ACETATE, POTASSIUM CHLORIDE, AND MAGNESIUM CHLORIDE: 526; 502; 368; 37; 30 INJECTION, SOLUTION INTRAVENOUS at 08:45

## 2017-08-17 RX ADMIN — SODIUM CHLORIDE, POTASSIUM CHLORIDE, SODIUM LACTATE AND CALCIUM CHLORIDE: 600; 310; 30; 20 INJECTION, SOLUTION INTRAVENOUS at 08:42

## 2017-08-17 RX ADMIN — ROCURONIUM BROMIDE 20 MG: 10 INJECTION INTRAVENOUS at 08:50

## 2017-08-17 RX ADMIN — CEFAZOLIN SODIUM 2 G: 1 INJECTION, POWDER, FOR SOLUTION INTRAMUSCULAR; INTRAVENOUS at 07:20

## 2017-08-17 RX ADMIN — ESMOLOL HYDROCHLORIDE 10 MG: 10 INJECTION, SOLUTION INTRAVENOUS at 10:10

## 2017-08-17 RX ADMIN — ROCURONIUM BROMIDE 10 MG: 10 INJECTION INTRAVENOUS at 09:12

## 2017-08-17 RX ADMIN — FENTANYL CITRATE 50 MCG: 50 INJECTION, SOLUTION INTRAMUSCULAR; INTRAVENOUS at 09:11

## 2017-08-17 RX ADMIN — HYDROMORPHONE HYDROCHLORIDE 1 MG: 2 INJECTION, SOLUTION INTRAMUSCULAR; INTRAVENOUS; SUBCUTANEOUS at 11:09

## 2017-08-17 RX ADMIN — ROCURONIUM BROMIDE 10 MG: 10 INJECTION INTRAVENOUS at 10:53

## 2017-08-17 RX ADMIN — KETOROLAC TROMETHAMINE 15 MG: 30 INJECTION, SOLUTION INTRAMUSCULAR; INTRAVENOUS at 18:26

## 2017-08-17 RX ADMIN — METOCLOPRAMIDE 10 MG: 5 INJECTION, SOLUTION INTRAMUSCULAR; INTRAVENOUS at 07:25

## 2017-08-17 RX ADMIN — KETOROLAC TROMETHAMINE 15 MG: 15 INJECTION, SOLUTION INTRAMUSCULAR; INTRAVENOUS at 12:35

## 2017-08-17 RX ADMIN — FENTANYL CITRATE 200 MCG: 50 INJECTION, SOLUTION INTRAMUSCULAR; INTRAVENOUS at 07:25

## 2017-08-17 RX ADMIN — FENTANYL CITRATE 50 MCG: 50 INJECTION, SOLUTION INTRAMUSCULAR; INTRAVENOUS at 09:38

## 2017-08-17 RX ADMIN — SODIUM CHLORIDE, SODIUM GLUCONATE, SODIUM ACETATE, POTASSIUM CHLORIDE, AND MAGNESIUM CHLORIDE 100 ML/HR: 526; 502; 368; 37; 30 INJECTION, SOLUTION INTRAVENOUS at 12:21

## 2017-08-17 RX ADMIN — ESMOLOL HYDROCHLORIDE 10 MG: 10 INJECTION, SOLUTION INTRAVENOUS at 10:43

## 2017-08-17 RX ADMIN — FENTANYL CITRATE 50 MCG: 50 INJECTION, SOLUTION INTRAMUSCULAR; INTRAVENOUS at 08:51

## 2017-08-17 RX ADMIN — DEXAMETHASONE SODIUM PHOSPHATE 4 MG: 4 INJECTION, SOLUTION INTRAMUSCULAR; INTRAVENOUS at 07:25

## 2017-08-17 RX ADMIN — ROCURONIUM BROMIDE 10 MG: 10 INJECTION INTRAVENOUS at 10:05

## 2017-08-17 RX ADMIN — ESMOLOL HYDROCHLORIDE 10 MG: 10 INJECTION, SOLUTION INTRAVENOUS at 10:30

## 2017-08-17 RX ADMIN — SODIUM CHLORIDE, SODIUM LACTATE, POTASSIUM CHLORIDE, AND CALCIUM CHLORIDE 100 ML/HR: 600; 310; 30; 20 INJECTION, SOLUTION INTRAVENOUS at 14:16

## 2017-08-17 RX ADMIN — ROCURONIUM BROMIDE 10 MG: 10 INJECTION INTRAVENOUS at 10:26

## 2017-08-17 RX ADMIN — LIDOCAINE HYDROCHLORIDE 100 MG: 20 INJECTION, SOLUTION INFILTRATION; PERINEURAL at 07:25

## 2017-08-17 RX ADMIN — GLYCOPYRROLATE 0.4 MG: 0.2 INJECTION, SOLUTION INTRAMUSCULAR; INTRAVENOUS at 11:51

## 2017-08-17 RX ADMIN — ROCURONIUM BROMIDE 10 MG: 10 INJECTION INTRAVENOUS at 11:25

## 2017-08-17 RX ADMIN — SODIUM CHLORIDE, POTASSIUM CHLORIDE, SODIUM LACTATE AND CALCIUM CHLORIDE 100 ML/HR: 600; 310; 30; 20 INJECTION, SOLUTION INTRAVENOUS at 06:29

## 2017-08-17 RX ADMIN — PROPOFOL 170 MG: 10 INJECTION, EMULSION INTRAVENOUS at 07:25

## 2017-08-17 NOTE — ANESTHESIA POSTPROCEDURE EVALUATION
Patient: Jonathon Carrero    Procedure Summary     Date Anesthesia Start Anesthesia Stop Room / Location    08/17/17 0720 1204 [facility] OR 03 / [facility]       Procedure Diagnosis Surgeon Provider    NISSEN FUNDOPLICATION LAPAROSCOPIC  (N/A Abdomen) Gastroesophageal reflux disease with esophagitis  (Gastroesophageal reflux disease with esophagitis [K21.0]) MD Bennett Dumont MD          Anesthesia Type: general  Last vitals  BP        Temp        Pulse       Resp        SpO2          Post Anesthesia Care and Evaluation    Patient location during evaluation: PACU  Patient participation: complete - patient cannot participate  Level of consciousness: responsive to noxious stimuli  Pain score: 0  Pain management: adequate  Airway patency: patent  Anesthetic complications: anesthesia complication  PONV Status: none  Cardiovascular status: acceptable  Respiratory status: acceptable  Hydration status: acceptable

## 2017-08-17 NOTE — PLAN OF CARE
Problem: Patient Care Overview (Adult)  Goal: Plan of Care Review  Outcome: Ongoing (interventions implemented as appropriate)    08/17/17 7755   Coping/Psychosocial Response Interventions   Plan Of Care Reviewed With patient   Patient Care Overview   Progress improving   Outcome Evaluation   Outcome Summary/Follow up Plan Had Surgery today admitted to floor, no s/s infection, five surgical incision with steri strips noted, no drainage noted       Goal: Adult Individualization and Mutuality  Outcome: Ongoing (interventions implemented as appropriate)  Goal: Discharge Needs Assessment  Outcome: Ongoing (interventions implemented as appropriate)    Problem: Perioperative Period (Adult)  Goal: Signs and Symptoms of Listed Potential Problems Will be Absent or Manageable (Perioperative Period)  Outcome: Ongoing (interventions implemented as appropriate)    Problem: Fundoplication (Nissen,Thal,Toupet) (NICU)  Goal: Signs and Symptoms of Listed Potential Problems Will be Absent or Manageable (Fundoplication)  Outcome: Ongoing (interventions implemented as appropriate)

## 2017-08-17 NOTE — ANESTHESIA PREPROCEDURE EVALUATION
Anesthesia Evaluation     no history of anesthetic complications:  NPO Solid Status: > 8 hours  NPO Liquid Status: > 8 hours     Airway   Mallampati: II  TM distance: >3 FB  Neck ROM: full  possible difficult intubation  Dental          Pulmonary - negative pulmonary ROS and normal exam    breath sounds clear to auscultation  Cardiovascular - normal exam  Exercise tolerance: good (4-7 METS)    Rhythm: regular    (-) angina, murmur      Neuro/Psych- negative ROS  GI/Hepatic/Renal/Endo    (+) obesity,  hiatal hernia, GERD, hypothyroidism,     Musculoskeletal     Abdominal   (+) obese,    Substance History   (+) alcohol use (occasionally),   (-) drug use     OB/GYN negative ob/gyn ROS         Other - negative ROS                                       Anesthesia Plan    ASA 2     general     intravenous induction   Anesthetic plan and risks discussed with patient.

## 2017-08-17 NOTE — H&P (VIEW-ONLY)
Chief Complaint   Patient presents with   • Follow-up     Recheck hiatus hernia.        HPI  Here for per- op visit before lap Nissen. Small hiatal hernia noted. Reflux remains sporadic with severe episodes noted as well.  Past Medical History:   Diagnosis Date   • Acquired hypothyroidism    • Contracture of ankle    • Deformity of calcaneus    • GERD (gastroesophageal reflux disease)    • Hiatal hernia    • Iron deficiency anemia    • Malaise and fatigue    • Obesity     bmi 32   • Peroneal tendinitis    • TMJ (temporomandibular joint disorder)        Past Surgical History:   Procedure Laterality Date   • COLONOSCOPY  03/15/2013    Internal and external hemorrhoids found.   • DEBRIDEMENT LEG  12/04/2014    Debridement and repair of peroneal tendon as well as resection of peroneal tubercle right   • ENDOSCOPY  03/15/2013    esophagitis seen. Biopsy taken. A hiatus hernia found in the stomach. Gastritis found in the stomach. Biopsy taken. Normal duodenum.   • ENDOSCOPY  06/16/2011    esophagitis seen. Biopsy taken. A hiatus hernia found in the stomach. Gastritis found in the stomach. Biopsy taken. Normal duodenum.   • ENDOSCOPY N/A 5/26/2017    Procedure: ESOPHAGOGASTRODUODENOSCOPY possible dilation ;  Surgeon: Murali Brower MD;  Location: [facility] ENDOSCOPY;  Service:    • ENTEROSCOPY SMALL BOWEL  03/15/2013    Capsule took greater then 3 hours to pass through stomach. Small bowel appeared normal. Although prp was robert.   • LIPOSUCTION ABDOMINAL  2008         Current Outpatient Prescriptions:   •  ferrous sulfate 325 (65 FE) MG tablet, Take 325 mg by mouth Daily With Breakfast., Disp: , Rfl:   •  levothyroxine (SYNTHROID) 125 MCG tablet, Take 1 tablet by mouth Daily., Disp: 30 tablet, Rfl: 11  •  sucralfate (CARAFATE) 1 G tablet, Take 1 tablet by mouth 4 (Four) Times a Day., Disp: 120 tablet, Rfl: 3  •  testosterone (ANDRODERM) 2 MG/24HR patch 24 hour 24 hour patch, Place 1 patch on the skin Every Night., Disp: 30  patch, Rfl: 5    No Known Allergies    Family History   Problem Relation Age of Onset   • Diabetes Other    • Heart attack Father        Social History     Social History   • Marital status:      Social History Main Topics   • Smoking status: Former Smoker     Years: 14.00     Quit date: 2000   • Smokeless tobacco: Never Used   • Alcohol use Yes      Comment: seldom   • Drug use: No       Review of Systems  Nothing to add- no dysphagia  Physical Exam   Cardiovascular: Regular rhythm.    Pulmonary/Chest: Breath sounds normal. He has no decreased breath sounds.   Abdominal: Soft. Normal appearance and bowel sounds are normal. There is no tenderness.         ASSESSMENT    Jonathon was seen today for follow-up.    Diagnoses and all orders for this visit:    Gastroesophageal reflux disease, esophagitis presence not specified      PLAN    1. Laparoscopic possible open Nissen fundoplication.    The procedure is explained to the patient.  A booklet with illustrations is provided.    The risks of bleeding, infection, injury to surrounding organs (such as spleen, liver, stomach, small bowel, colon, and esophagus) are explained.    The risk of recurrent disease because of Nissen failure or recurrent hiatal hernia is explained.  It is explained to the patient that the Nissen wrap may lead to dysphagia and pain with swallowing.  While most of these are temporary problems, the possible need for dilatation is mentioned.  The benefits of surgery include possibility of discontinuing all anti-reflux medications.  It is explained to the patient that if Lewis's esophagus is present, Nissen fundoplication will not decrease the cancer risk and conscientious annual surveillance will still be necessary.  It is explained that the transfusion risk is low.  Alternatives include addition therapy, loose clothing, and continued proton pump inhibitors.  It is also explained that weight loss in and of itself may improve symptoms.  After  consideration, the patient desires to proceed with operation.            This document has been electronically signed by Ozzie Barger MD on August 14, 2017 10:45 AM

## 2017-08-17 NOTE — OP NOTE
NISSEN FUNDOPLICATION LAPAROSCOPIC  Procedure Note    Jonathon Carrero  8/17/2017    Pre-op Diagnosis:   Gastroesophageal reflux disease with esophagitis [K21.0]    Post-op Diagnosis:     Post-Op Diagnosis Codes:     * Gastroesophageal reflux disease with esophagitis [K21.0]    Procedure(s):   LAPAROSCOPIC NISSEN FUNDOPLICATION OVER A 54F DILATOR    Surgeon(s):  Ozzie Barger MD    Anesthesia: General    Staff:   Circulator: Swetha Garay RN; Michael García RN  Scrub Person: Shannan Ng; Mary Morris; Matt Sparrow  Assistant: Diana Bravo CSA    Estimated Blood Loss: 40 cc    Specimens:                * No specimens in log *    Findings: Moderate hiatal hernia with dense adhesions    Complications: None    Indications: Patient is a 47-year-old white man with severe ongoing esophagitis and reflux.  This is been unresponsive to medical management.  He is taken to the operating room today for Nissen fundoplication.    Description of procedure: The patient was brought to the operating room and placed supine on the operating table. After adequate general endotracheal anesthesia, the legs were placed in lithotomy position, and the abdominal area was prepped and draped in a sterile manner.  Briefing and timeout were performed and all parties were in agreement.      A transverse incision was made in the midclavicular line on the right side subcostally and a 5 mm Xcel trocar was uneventfully passed into the abdomen under direct vision with no visceral injury. The abdomen was insufflated to a total of 15 mmHg with 4.2 L of CO2. A 5 mm laparoscope was placed and an excellent view of the abdomen was obtained. A 5mm trocar was placed under direct vision at the umbilicus. 11 mm trocars were placed in the epigastrium and anterior axillary lines along the left costal margin. A 5 mm trocar was placed in the left subcostal midclavicular line, all under direct vision, all without complication. The bed was then tilted  in reverse Trendelenburg and tipped to the right. He tolerated the positioning and insufflation without difficulty.     The GE junction was grasped with an atraumatic grasper and pulled inferiorly. Tissue overlying the caudate lobe of the liver was dissected free with a Harmonic scalpel until the right nella was clearly visualized.  It was apparent that there was a moderately large hiatal hernia present with a portion of the stomach stuck in to the chest.  There were numerous dense adhesions around the GE junction and each of these required careful dissection in order to avoid injury to either the esophagus stomach or spleen.  Similarly, the left side was dissected with Harmonic cauterization of the middle and upper short gastric vessels, completely freeing the fundus.     The space behind the esophagus and anterior to the posterior vagus nerve was then dissected free and the space opened, creating a window behind the esophagus.  This was very difficult due to the dense adhesions present.  After tedious dissection, the right and left crura of the diaphragm were clearly visualized behind the esophagus, and were approximated posteriorly with 2 interrupted 2-0 Ethibond sutures tied with extracorporeal knots. The NG tube was then removed and a 54 dilator was then passed atraumatically through the esophagus and into the stomach. The fundus was then brought posterior to the esophagus allowing a 360 degree wrap to be created. The right and left sides of the fundus were sutured together, incorporating the GE junction with the bottom sutures. This was done with 2-0 Ethibond sutures. The remaining sutures were placed sutures  fundus to fundus. All sutures were tied with extracorporeal knots. The dilator was then brought back into the esophagus and then passed into the stomach. No obstruction was encountered. The dilator was then completely removed. All areas were checked for bleeding and none was seen. He was then placed  supine. Trocars were removed and the abdomen was allowed to flatten. The 11 mm trocar sites were closed with 0 Vicryl on the fascia and 4-0 subcuticular on skin. Other wounds were closed with 4-0 subcuticular on skin only.     The procedure was terminated. The patient tolerated it well. Sponge and needle counts were correct. Returned to recovery in satisfactory  condition.

## 2017-08-17 NOTE — PLAN OF CARE
Problem: Patient Care Overview (Adult)  Goal: Plan of Care Review  Outcome: Ongoing (interventions implemented as appropriate)    08/17/17 1240   Coping/Psychosocial Response Interventions   Plan Of Care Reviewed With patient   Patient Care Overview   Progress improving   Outcome Evaluation   Outcome Summary/Follow up Plan vss. denies pain at this time. no distress noted. meets d/c criteria         Problem: Perioperative Period (Adult)  Goal: Signs and Symptoms of Listed Potential Problems Will be Absent or Manageable (Perioperative Period)  Outcome: Ongoing (interventions implemented as appropriate)

## 2017-08-17 NOTE — ANESTHESIA PROCEDURE NOTES
Airway  Urgency: elective    Date/Time: 8/17/2017 7:20 AM  End Time:8/17/2017 7:21 AM  Airway not difficult    General Information and Staff    Patient location during procedure: OR  CRNA: OWEN SALTER    Indications and Patient Condition  Indications for airway management: airway protection    Preoxygenated: yes  MILS maintained throughout  Mask difficulty assessment: 0 - not attempted    Final Airway Details  Final airway type: endotracheal airway      Successful airway: ETT  Cuffed: yes   Successful intubation technique: direct laryngoscopy  Facilitating devices/methods: intubating stylet  Blade: Roman  Blade size: #4  ETT size: 7.5 mm  Cormack-Lehane Classification: grade IIa - partial view of glottis  Placement verified by: chest auscultation and capnometry   Cuff volume (mL): 7  Measured from: lips  ETT to lips (cm): 23  Number of attempts at approach: 1

## 2017-08-18 LAB
ANION GAP SERPL CALCULATED.3IONS-SCNC: 8 MMOL/L (ref 5–15)
BASOPHILS # BLD AUTO: 0 10*3/MM3 (ref 0–0.2)
BASOPHILS NFR BLD AUTO: 0 % (ref 0–2)
BUN BLD-MCNC: 15 MG/DL (ref 7–21)
BUN/CREAT SERPL: 15.8 (ref 7–25)
CALCIUM SPEC-SCNC: 7.8 MG/DL (ref 8.4–10.2)
CHLORIDE SERPL-SCNC: 104 MMOL/L (ref 95–110)
CO2 SERPL-SCNC: 26 MMOL/L (ref 22–31)
CREAT BLD-MCNC: 0.95 MG/DL (ref 0.7–1.3)
DEPRECATED RDW RBC AUTO: 41.5 FL (ref 35.1–43.9)
EOSINOPHIL # BLD AUTO: 0.01 10*3/MM3 (ref 0–0.7)
EOSINOPHIL NFR BLD AUTO: 0.1 % (ref 0–7)
ERYTHROCYTE [DISTWIDTH] IN BLOOD BY AUTOMATED COUNT: 12.7 % (ref 11.5–14.5)
GFR SERPL CREATININE-BSD FRML MDRD: 85 ML/MIN/1.73 (ref 60–147)
GLUCOSE BLD-MCNC: 90 MG/DL (ref 60–100)
HCT VFR BLD AUTO: 36.3 % (ref 39–49)
HGB BLD-MCNC: 12.4 G/DL (ref 13.7–17.3)
IMM GRANULOCYTES # BLD: 0.01 10*3/MM3 (ref 0–0.02)
IMM GRANULOCYTES NFR BLD: 0.1 % (ref 0–0.5)
LYMPHOCYTES # BLD AUTO: 1.21 10*3/MM3 (ref 0.6–4.2)
LYMPHOCYTES NFR BLD AUTO: 16.5 % (ref 10–50)
MAGNESIUM SERPL-MCNC: 2.2 MG/DL (ref 1.6–2.3)
MCH RBC QN AUTO: 30.7 PG (ref 26.5–34)
MCHC RBC AUTO-ENTMCNC: 34.2 G/DL (ref 31.5–36.3)
MCV RBC AUTO: 89.9 FL (ref 80–98)
MONOCYTES # BLD AUTO: 0.72 10*3/MM3 (ref 0–0.9)
MONOCYTES NFR BLD AUTO: 9.8 % (ref 0–12)
NEUTROPHILS # BLD AUTO: 5.4 10*3/MM3 (ref 2–8.6)
NEUTROPHILS NFR BLD AUTO: 73.5 % (ref 37–80)
PHOSPHATE SERPL-MCNC: 2.6 MG/DL (ref 2.4–4.4)
PLATELET # BLD AUTO: 160 10*3/MM3 (ref 150–450)
PMV BLD AUTO: 10.2 FL (ref 8–12)
POTASSIUM BLD-SCNC: 3.6 MMOL/L (ref 3.5–5.1)
RBC # BLD AUTO: 4.04 10*6/MM3 (ref 4.37–5.74)
SODIUM BLD-SCNC: 138 MMOL/L (ref 137–145)
WBC NRBC COR # BLD: 7.35 10*3/MM3 (ref 3.2–9.8)

## 2017-08-18 PROCEDURE — 83735 ASSAY OF MAGNESIUM: CPT | Performed by: SURGERY

## 2017-08-18 PROCEDURE — 80048 BASIC METABOLIC PNL TOTAL CA: CPT | Performed by: SURGERY

## 2017-08-18 PROCEDURE — 25010000002 ENOXAPARIN PER 10 MG: Performed by: SURGERY

## 2017-08-18 PROCEDURE — 99024 POSTOP FOLLOW-UP VISIT: CPT | Performed by: SURGERY

## 2017-08-18 PROCEDURE — 25010000002 KETOROLAC TROMETHAMINE PER 15 MG: Performed by: SURGERY

## 2017-08-18 PROCEDURE — 84100 ASSAY OF PHOSPHORUS: CPT | Performed by: SURGERY

## 2017-08-18 PROCEDURE — 85025 COMPLETE CBC W/AUTO DIFF WBC: CPT | Performed by: SURGERY

## 2017-08-18 RX ORDER — BISACODYL 10 MG
10 SUPPOSITORY, RECTAL RECTAL DAILY
Status: DISCONTINUED | OUTPATIENT
Start: 2017-08-18 | End: 2017-08-19 | Stop reason: HOSPADM

## 2017-08-18 RX ADMIN — KETOROLAC TROMETHAMINE 15 MG: 30 INJECTION, SOLUTION INTRAMUSCULAR; INTRAVENOUS at 06:09

## 2017-08-18 RX ADMIN — KETOROLAC TROMETHAMINE 15 MG: 30 INJECTION, SOLUTION INTRAMUSCULAR; INTRAVENOUS at 00:22

## 2017-08-18 RX ADMIN — ENOXAPARIN SODIUM 40 MG: 40 INJECTION SUBCUTANEOUS at 08:44

## 2017-08-18 RX ADMIN — KETOROLAC TROMETHAMINE 15 MG: 30 INJECTION, SOLUTION INTRAMUSCULAR; INTRAVENOUS at 11:49

## 2017-08-18 RX ADMIN — SODIUM CHLORIDE, SODIUM LACTATE, POTASSIUM CHLORIDE, AND CALCIUM CHLORIDE 100 ML/HR: 600; 310; 30; 20 INJECTION, SOLUTION INTRAVENOUS at 23:05

## 2017-08-18 RX ADMIN — LEVOTHYROXINE SODIUM 125 MCG: 125 TABLET ORAL at 08:44

## 2017-08-18 RX ADMIN — SODIUM CHLORIDE, SODIUM LACTATE, POTASSIUM CHLORIDE, AND CALCIUM CHLORIDE 100 ML/HR: 600; 310; 30; 20 INJECTION, SOLUTION INTRAVENOUS at 00:22

## 2017-08-18 RX ADMIN — SODIUM CHLORIDE, SODIUM LACTATE, POTASSIUM CHLORIDE, AND CALCIUM CHLORIDE 100 ML/HR: 600; 310; 30; 20 INJECTION, SOLUTION INTRAVENOUS at 10:58

## 2017-08-18 NOTE — PLAN OF CARE
Problem: Patient Care Overview (Adult)  Goal: Plan of Care Review  Outcome: Ongoing (interventions implemented as appropriate)    08/18/17 0750   Coping/Psychosocial Response Interventions   Plan Of Care Reviewed With patient   Patient Care Overview   Progress improving   Outcome Evaluation   Outcome Summary/Follow up Plan pt ambulating halls per self today.       Goal: Adult Individualization and Mutuality  Outcome: Ongoing (interventions implemented as appropriate)  Goal: Discharge Needs Assessment  Outcome: Ongoing (interventions implemented as appropriate)    Problem: Perioperative Period (Adult)  Goal: Signs and Symptoms of Listed Potential Problems Will be Absent or Manageable (Perioperative Period)  Outcome: Ongoing (interventions implemented as appropriate)    Problem: Fundoplication (Nissen,Thal,Toupet) (NICU)  Goal: Signs and Symptoms of Listed Potential Problems Will be Absent or Manageable (Fundoplication)  Outcome: Ongoing (interventions implemented as appropriate)

## 2017-08-18 NOTE — PLAN OF CARE
Problem: Patient Care Overview (Adult)  Goal: Plan of Care Review  Outcome: Ongoing (interventions implemented as appropriate)    08/18/17 0426   Coping/Psychosocial Response Interventions   Plan Of Care Reviewed With patient   Patient Care Overview   Progress improving   Outcome Evaluation   Outcome Summary/Follow up Plan Pain managed. Tolerating clears. vss       Goal: Adult Individualization and Mutuality  Outcome: Ongoing (interventions implemented as appropriate)  Goal: Discharge Needs Assessment  Outcome: Ongoing (interventions implemented as appropriate)    Problem: Perioperative Period (Adult)  Goal: Signs and Symptoms of Listed Potential Problems Will be Absent or Manageable (Perioperative Period)  Outcome: Ongoing (interventions implemented as appropriate)    Problem: Fundoplication (Nissen,Thal,Toupet) (NICU)  Goal: Signs and Symptoms of Listed Potential Problems Will be Absent or Manageable (Fundoplication)  Outcome: Ongoing (interventions implemented as appropriate)

## 2017-08-19 ENCOUNTER — APPOINTMENT (OUTPATIENT)
Dept: GENERAL RADIOLOGY | Facility: HOSPITAL | Age: 47
End: 2017-08-19

## 2017-08-19 VITALS
OXYGEN SATURATION: 95 % | WEIGHT: 216.93 LBS | HEIGHT: 69 IN | HEART RATE: 82 BPM | TEMPERATURE: 98.4 F | RESPIRATION RATE: 18 BRPM | DIASTOLIC BLOOD PRESSURE: 74 MMHG | SYSTOLIC BLOOD PRESSURE: 128 MMHG | BODY MASS INDEX: 32.13 KG/M2

## 2017-08-19 PROCEDURE — 25010000002 HYDROMORPHONE PER 4 MG: Performed by: SURGERY

## 2017-08-19 PROCEDURE — 74220 X-RAY XM ESOPHAGUS 1CNTRST: CPT

## 2017-08-19 PROCEDURE — 25010000002 ENOXAPARIN PER 10 MG: Performed by: SURGERY

## 2017-08-19 PROCEDURE — 0 DIATRIZOATE MEGLUMINE & SODIUM PER 1 ML: Performed by: SURGERY

## 2017-08-19 RX ORDER — HYDROCODONE BITARTRATE AND ACETAMINOPHEN 7.5; 325 MG/1; MG/1
1 TABLET ORAL EVERY 6 HOURS PRN
Qty: 25 TABLET | Refills: 0 | Status: SHIPPED | OUTPATIENT
Start: 2017-08-19 | End: 2017-11-12

## 2017-08-19 RX ADMIN — HYDROMORPHONE HYDROCHLORIDE 1 MG: 1 INJECTION, SOLUTION INTRAMUSCULAR; INTRAVENOUS; SUBCUTANEOUS at 14:15

## 2017-08-19 RX ADMIN — DIATRIZOATE MEGLUMINE AND DIATRIZOATE SODIUM 120 ML: 660; 100 LIQUID ORAL; RECTAL at 09:00

## 2017-08-19 RX ADMIN — HYDROMORPHONE HYDROCHLORIDE 1 MG: 1 INJECTION, SOLUTION INTRAMUSCULAR; INTRAVENOUS; SUBCUTANEOUS at 06:11

## 2017-08-19 RX ADMIN — LEVOTHYROXINE SODIUM 125 MCG: 125 TABLET ORAL at 09:36

## 2017-08-19 RX ADMIN — HYDROMORPHONE HYDROCHLORIDE 1 MG: 1 INJECTION, SOLUTION INTRAMUSCULAR; INTRAVENOUS; SUBCUTANEOUS at 11:48

## 2017-08-19 RX ADMIN — BISACODYL 10 MG: 10 SUPPOSITORY RECTAL at 09:36

## 2017-08-19 RX ADMIN — ENOXAPARIN SODIUM 40 MG: 40 INJECTION SUBCUTANEOUS at 09:36

## 2017-08-19 RX ADMIN — SODIUM CHLORIDE, SODIUM LACTATE, POTASSIUM CHLORIDE, AND CALCIUM CHLORIDE 100 ML/HR: 600; 310; 30; 20 INJECTION, SOLUTION INTRAVENOUS at 09:39

## 2017-08-19 RX ADMIN — HYDROMORPHONE HYDROCHLORIDE 1 MG: 1 INJECTION, SOLUTION INTRAMUSCULAR; INTRAVENOUS; SUBCUTANEOUS at 00:16

## 2017-08-19 NOTE — PLAN OF CARE
Problem: Patient Care Overview (Adult)  Goal: Plan of Care Review  Outcome: Ongoing (interventions implemented as appropriate)    08/19/17 0817   Coping/Psychosocial Response Interventions   Plan Of Care Reviewed With patient   Patient Care Overview   Progress no change   Outcome Evaluation   Outcome Summary/Follow up Plan pt had pain throughout the night. Esophogram scheduled for today. pt ambulating independently.       Goal: Adult Individualization and Mutuality  Outcome: Ongoing (interventions implemented as appropriate)  Goal: Discharge Needs Assessment  Outcome: Ongoing (interventions implemented as appropriate)    Problem: Perioperative Period (Adult)  Goal: Signs and Symptoms of Listed Potential Problems Will be Absent or Manageable (Perioperative Period)  Outcome: Ongoing (interventions implemented as appropriate)    Problem: Fundoplication (Nissen,Thal,Toupet) (NICU)  Goal: Signs and Symptoms of Listed Potential Problems Will be Absent or Manageable (Fundoplication)  Outcome: Ongoing (interventions implemented as appropriate)

## 2017-08-19 NOTE — DISCHARGE INSTR - LAB
Solange Chandler  1 Week  [phone]  Info was sent to the office, if they do not contact you within one business day with appointment info, please call.    Dr Barger  1 Week  [phone]  Info was sent to the office, if they do not contact you within one business day with appointment info, please call.

## 2017-08-19 NOTE — DISCHARGE SUMMARY
"  Subjective:   Pt doing well.  Tolerating diet.  Pain controlled.  Wants to go home.       /74 (BP Location: Right arm, Patient Position: Sitting)  Pulse 82  Temp 98.4 °F (36.9 °C) (Oral)   Resp 18  Ht 69\" (175.3 cm)  Wt 216 lb 14.9 oz (98.4 kg)  SpO2 95%  BMI 32.04 kg/m2    Lab Results (last 24 hours)     ** No results found for the last 24 hours. **            Physical exam: alert, appropriate, nontoxic, sitting up   Abd soft  Wounds clean  Swallow wnl      Assessment : Pod 2 lap nissen for gerd    Plan: D/c to home  Norco 7.5 tabs gave 25  Resume home meds except carafate  Diet as discussed.   F/u with Dr Barger this week or call us prn.                EMR Dragon/Transcription disclaimer:  Much of this encounter note is on electronic transcription/translation of spoken language to printed text.  The electronic translation of spoken language may permit erroneous or at times, nonsensical words or phrases to be inadvertently transcribed;  Although I have reviewed the note for such errors, some may still exist.      "

## 2017-08-28 ENCOUNTER — OFFICE VISIT (OUTPATIENT)
Dept: SURGERY | Facility: CLINIC | Age: 47
End: 2017-08-28

## 2017-08-28 VITALS
WEIGHT: 214 LBS | DIASTOLIC BLOOD PRESSURE: 82 MMHG | BODY MASS INDEX: 31.7 KG/M2 | SYSTOLIC BLOOD PRESSURE: 134 MMHG | HEIGHT: 69 IN

## 2017-08-28 DIAGNOSIS — Z98.890 S/P NISSEN FUNDOPLICATION (WITHOUT GASTROSTOMY TUBE) PROCEDURE: Primary | ICD-10-CM

## 2017-08-28 PROBLEM — K21.9 GASTROESOPHAGEAL REFLUX DISEASE: Status: RESOLVED | Noted: 2017-08-14 | Resolved: 2017-08-28

## 2017-08-28 PROBLEM — K21.00 GASTROESOPHAGEAL REFLUX DISEASE WITH ESOPHAGITIS: Status: RESOLVED | Noted: 2017-08-17 | Resolved: 2017-08-28

## 2017-08-28 PROCEDURE — 99024 POSTOP FOLLOW-UP VISIT: CPT | Performed by: SURGERY

## 2017-08-28 NOTE — PROGRESS NOTES
CHIEF COMPLAINT:   Chief Complaint   Patient presents with   • Post-op Follow-up     Post operative laparoscopic nissen fundoplication 8-17-17.       HPI: This patient is seen following a laparoscopic Nissen fundoplication.  Patient reports no problems. Eating well without any significant nausea. Having good bowel function. No dysphagia- ate chicken nuggets last night. No problems with constipation or diarrhea. No urinary complaints. Denies fever. Ambulating well and slowly returning to normal activities.    PHYSICAL EXAM:    ABD: Incisions are healing well without any erythema or signs of infection.    ASSESSMENT:    Jonathon was seen today for post-op follow-up.    Diagnoses and all orders for this visit:    S/P Nissen fundoplication (without gastrostomy tube) procedure      PLAN:    1.The patient will follow-up 3 weeks unless there are any problems.  2. May shower.   3. May return to normal activity without restrictions 3 weeks.    Signed by Ozzie Barger MD

## 2017-09-18 ENCOUNTER — OFFICE VISIT (OUTPATIENT)
Dept: SURGERY | Facility: CLINIC | Age: 47
End: 2017-09-18

## 2017-09-18 VITALS
SYSTOLIC BLOOD PRESSURE: 126 MMHG | DIASTOLIC BLOOD PRESSURE: 82 MMHG | WEIGHT: 213 LBS | BODY MASS INDEX: 31.55 KG/M2 | HEIGHT: 69 IN

## 2017-09-18 DIAGNOSIS — Z98.890 S/P LAPAROSCOPIC FUNDOPLICATION: Primary | ICD-10-CM

## 2017-09-18 PROCEDURE — 99024 POSTOP FOLLOW-UP VISIT: CPT | Performed by: SURGERY

## 2017-09-18 NOTE — PROGRESS NOTES
CHIEF COMPLAINT:   Chief Complaint   Patient presents with   • Follow-up     Recheck Laparoscopic Nissen fundoplication 8-17-17.       [place]: This patient is seen following a laparoscopic Nissen fundoplication..    Patient reports no problems. Eating well without any significant nausea. Having good bowel function. No dysphagia. [place] problems with constipation; no diarrhea. No urinary complaints. Denies fever. Ambulating well and slowly returning to normal activities.    PHYSICAL EXAM:    ABD: Incisions are healing well without any erythema or signs of infection.    ASSESSMENT:    Jonathon was seen today for follow-up.    Diagnoses and all orders for this visit:    S/P laparoscopic fundoplication      PLAN:    1.The patient will follow-up prn unless there are any problems.  2. May shower.   3. May return to normal activity without restrictions now.    Signed by Ozzie Barger MD

## 2017-11-27 ENCOUNTER — APPOINTMENT (OUTPATIENT)
Dept: LAB | Facility: HOSPITAL | Age: 47
End: 2017-11-27

## 2017-11-27 PROCEDURE — 85025 COMPLETE CBC W/AUTO DIFF WBC: CPT | Performed by: NURSE PRACTITIONER

## 2017-11-27 PROCEDURE — 36415 COLL VENOUS BLD VENIPUNCTURE: CPT | Performed by: NURSE PRACTITIONER

## 2017-11-27 PROCEDURE — 83540 ASSAY OF IRON: CPT | Performed by: NURSE PRACTITIONER

## 2017-11-27 PROCEDURE — 84443 ASSAY THYROID STIM HORMONE: CPT | Performed by: NURSE PRACTITIONER

## 2017-11-28 ENCOUNTER — TELEPHONE (OUTPATIENT)
Dept: FAMILY MEDICINE CLINIC | Facility: CLINIC | Age: 47
End: 2017-11-28

## 2017-11-28 NOTE — TELEPHONE ENCOUNTER
LANRE Cooper, Mr. Carrero has been called with his recent Iron results & recommendations.  Continue his current medications and follow-up as planned or sooner if any problems.      ----- Message from LANRE Angulo sent at 11/27/2017  2:07 PM CST -----  Can you let him know his lab is good-no changes for now.

## 2017-11-29 ENCOUNTER — OFFICE VISIT (OUTPATIENT)
Dept: FAMILY MEDICINE CLINIC | Facility: CLINIC | Age: 47
End: 2017-11-29

## 2017-11-29 ENCOUNTER — APPOINTMENT (OUTPATIENT)
Dept: LAB | Facility: HOSPITAL | Age: 47
End: 2017-11-29

## 2017-11-29 VITALS
BODY MASS INDEX: 31.99 KG/M2 | HEIGHT: 69 IN | DIASTOLIC BLOOD PRESSURE: 98 MMHG | HEART RATE: 80 BPM | SYSTOLIC BLOOD PRESSURE: 138 MMHG | WEIGHT: 216 LBS | OXYGEN SATURATION: 89 %

## 2017-11-29 DIAGNOSIS — R53.83 MALAISE AND FATIGUE: ICD-10-CM

## 2017-11-29 DIAGNOSIS — R53.81 MALAISE AND FATIGUE: ICD-10-CM

## 2017-11-29 DIAGNOSIS — R79.89 LOW TESTOSTERONE LEVEL IN MALE: ICD-10-CM

## 2017-11-29 DIAGNOSIS — Z00.00 GENERAL MEDICAL EXAM: Primary | ICD-10-CM

## 2017-11-29 DIAGNOSIS — E04.9 THYROID ENLARGED: ICD-10-CM

## 2017-11-29 DIAGNOSIS — R79.81 LOW OXYGEN SATURATION: ICD-10-CM

## 2017-11-29 DIAGNOSIS — E03.9 HYPOTHYROIDISM (ACQUIRED): ICD-10-CM

## 2017-11-29 LAB
25(OH)D3 SERPL-MCNC: 45 NG/ML (ref 30–100)
ALBUMIN SERPL-MCNC: 4.3 G/DL (ref 3.4–4.8)
ALBUMIN/GLOB SERPL: 1.4 G/DL (ref 1.1–1.8)
ALP SERPL-CCNC: 45 U/L (ref 38–126)
ALT SERPL W P-5'-P-CCNC: 37 U/L (ref 21–72)
ANION GAP SERPL CALCULATED.3IONS-SCNC: 10 MMOL/L (ref 5–15)
ARTICHOKE IGE QN: 124 MG/DL (ref 1–129)
AST SERPL-CCNC: 28 U/L (ref 17–59)
BILIRUB SERPL-MCNC: 1 MG/DL (ref 0.2–1.3)
BUN BLD-MCNC: 16 MG/DL (ref 7–21)
BUN/CREAT SERPL: 16.7 (ref 7–25)
CALCIUM SPEC-SCNC: 9.1 MG/DL (ref 8.4–10.2)
CHLORIDE SERPL-SCNC: 101 MMOL/L (ref 95–110)
CHOLEST SERPL-MCNC: 205 MG/DL (ref 0–199)
CO2 SERPL-SCNC: 28 MMOL/L (ref 22–31)
CREAT BLD-MCNC: 0.96 MG/DL (ref 0.7–1.3)
GFR SERPL CREATININE-BSD FRML MDRD: 84 ML/MIN/1.73 (ref 63–147)
GLOBULIN UR ELPH-MCNC: 3 GM/DL (ref 2.3–3.5)
GLUCOSE BLD-MCNC: 95 MG/DL (ref 60–100)
HBA1C MFR BLD: 6 % (ref 4–5.6)
HDLC SERPL-MCNC: 64 MG/DL (ref 60–200)
LDLC/HDLC SERPL: 1.94 {RATIO} (ref 0–3.55)
MAGNESIUM SERPL-MCNC: 2.2 MG/DL (ref 1.6–2.3)
POTASSIUM BLD-SCNC: 4.2 MMOL/L (ref 3.5–5.1)
PROT SERPL-MCNC: 7.3 G/DL (ref 6.3–8.6)
PSA SERPL-MCNC: 0.4 NG/ML (ref 0–4)
SODIUM BLD-SCNC: 139 MMOL/L (ref 137–145)
TRIGL SERPL-MCNC: 83 MG/DL (ref 20–199)
VIT B12 BLD-MCNC: 277 PG/ML (ref 239–931)

## 2017-11-29 PROCEDURE — 84403 ASSAY OF TOTAL TESTOSTERONE: CPT | Performed by: NURSE PRACTITIONER

## 2017-11-29 PROCEDURE — 83036 HEMOGLOBIN GLYCOSYLATED A1C: CPT | Performed by: NURSE PRACTITIONER

## 2017-11-29 PROCEDURE — 82607 VITAMIN B-12: CPT | Performed by: NURSE PRACTITIONER

## 2017-11-29 PROCEDURE — 36415 COLL VENOUS BLD VENIPUNCTURE: CPT | Performed by: NURSE PRACTITIONER

## 2017-11-29 PROCEDURE — 82306 VITAMIN D 25 HYDROXY: CPT | Performed by: NURSE PRACTITIONER

## 2017-11-29 PROCEDURE — 80053 COMPREHEN METABOLIC PANEL: CPT | Performed by: NURSE PRACTITIONER

## 2017-11-29 PROCEDURE — 83735 ASSAY OF MAGNESIUM: CPT | Performed by: NURSE PRACTITIONER

## 2017-11-29 PROCEDURE — 80061 LIPID PANEL: CPT | Performed by: NURSE PRACTITIONER

## 2017-11-29 PROCEDURE — 99214 OFFICE O/P EST MOD 30 MIN: CPT | Performed by: NURSE PRACTITIONER

## 2017-11-29 PROCEDURE — 84153 ASSAY OF PSA TOTAL: CPT | Performed by: NURSE PRACTITIONER

## 2017-11-29 RX ORDER — LEVOTHYROXINE SODIUM 125 MCG
125 TABLET ORAL DAILY
Qty: 30 TABLET | Refills: 11 | Status: SHIPPED | OUTPATIENT
Start: 2017-11-29 | End: 2018-12-03 | Stop reason: SDUPTHER

## 2017-11-29 NOTE — PROGRESS NOTES
Chief Complaint   Patient presents with   • Annual Exam     Yearly visit     Subjective   Jonathon Carrero is a 47 y.o. male.     Fatigue   This is a recurrent problem. The current episode started more than 1 month ago. The problem occurs constantly. The problem has been gradually worsening. Associated symptoms include fatigue. Pertinent negatives include no anorexia, arthralgias, chills, congestion, coughing, diaphoresis, fever, headaches, joint swelling, myalgias, neck pain, numbness, rash, swollen glands, urinary symptoms, vertigo, visual change, vomiting or weakness. Nothing aggravates the symptoms. Treatments tried: rest, exercise  The treatment provided mild relief.   Thyroid Problem   Presents for follow-up visit. Symptoms include fatigue, hoarse voice and weight gain. Patient reports no anxiety, cold intolerance, constipation, depressed mood, diaphoresis, diarrhea, dry skin, hair loss, heat intolerance, leg swelling, menstrual problem, nail problem, palpitations, tremors, visual change or weight loss. The symptoms have been worsening.   Obesity   This is a recurrent problem. The current episode started more than 1 year ago. The problem occurs constantly. The problem has been gradually improving. Associated symptoms include fatigue. Pertinent negatives include no anorexia, arthralgias, chills, congestion, coughing, diaphoresis, fever, headaches, joint swelling, myalgias, neck pain, numbness, rash, swollen glands, urinary symptoms, vertigo, visual change, vomiting or weakness. Nothing aggravates the symptoms. He has tried walking (ymca 3 days a week ) for the symptoms. The treatment provided mild relief.   Sore Throat    This is a chronic problem. The current episode started more than 1 month ago. The problem has been rapidly worsening. The pain is severe. Associated symptoms include a hoarse voice and trouble swallowing. Pertinent negatives include no congestion, coughing, diarrhea, drooling, ear  discharge, ear pain, headaches, plugged ear sensation, neck pain, shortness of breath, stridor, swollen glands or vomiting. He has had no exposure to mono. He has tried acetaminophen for the symptoms. The treatment provided mild relief.        The following portions of the patient's history were reviewed and updated as appropriate: allergies, current medications, past social history and problem list.    Review of Systems   Constitutional: Positive for activity change, fatigue, unexpected weight change and weight gain. Negative for appetite change, chills, diaphoresis, fever and weight loss.        Working out and still not loosing weight    HENT: Positive for hoarse voice and trouble swallowing. Negative for congestion, dental problem, drooling, ear discharge, ear pain, facial swelling, sinus pressure and sneezing.         Sore throat hurts to swallow   Eyes: Negative.  Negative for photophobia, pain, discharge, redness, itching and visual disturbance.   Respiratory: Negative.  Negative for apnea, cough, choking, chest tightness, shortness of breath, wheezing and stridor.    Cardiovascular: Negative.  Negative for palpitations and leg swelling.   Gastrointestinal: Negative.  Negative for anorexia, constipation, diarrhea and vomiting.   Endocrine: Negative.  Negative for cold intolerance and heat intolerance.   Genitourinary: Negative.  Negative for difficulty urinating, dysuria, enuresis and menstrual problem.   Musculoskeletal: Negative.  Negative for arthralgias, back pain, gait problem, joint swelling, myalgias, neck pain and neck stiffness.   Skin: Negative.  Negative for color change, pallor and rash.   Allergic/Immunologic: Negative.  Negative for environmental allergies, food allergies and immunocompromised state.   Neurological: Negative.  Negative for dizziness, vertigo, tremors, seizures, syncope, facial asymmetry, speech difficulty, weakness, numbness and headaches.   Hematological: Negative.  Negative  "for adenopathy. Does not bruise/bleed easily.   Psychiatric/Behavioral: Negative.  Negative for agitation, behavioral problems, confusion, decreased concentration, dysphoric mood and hallucinations. The patient is not nervous/anxious.    All other systems reviewed and are negative.      Objective   /98 (BP Location: Left arm, Patient Position: Sitting, Cuff Size: Large Adult)  Pulse 80  Ht 69\" (175.3 cm)  Wt 216 lb (98 kg)  SpO2 (!) 89%  BMI 31.9 kg/m2  Physical Exam   Constitutional: He is oriented to person, place, and time. He appears well-developed and well-nourished. No distress.   HENT:   Head: Normocephalic and atraumatic.   Mouth/Throat: No oropharyngeal exudate.   Thyroid enlargement noted -right worse than left-tsh elevated-patient is taking his meds at this time    Eyes: EOM are normal. Pupils are equal, round, and reactive to light. Right eye exhibits no discharge. Left eye exhibits no discharge. No scleral icterus.   Neck: Normal range of motion. Neck supple. No JVD present. No tracheal deviation present. No thyromegaly present.   Cardiovascular: Normal rate, regular rhythm and normal heart sounds.  Exam reveals no gallop and no friction rub.    No murmur heard.  Pulmonary/Chest: Effort normal. No stridor. No respiratory distress. He has wheezes. He has no rales. He exhibits no tenderness.   sats 87 at first-up and walking around sats 98 throughout    Abdominal: Soft. Bowel sounds are normal. He exhibits no distension and no mass. There is no tenderness. There is no rebound and no guarding. No hernia.   Musculoskeletal: Normal range of motion. He exhibits no edema, tenderness or deformity.   Lymphadenopathy:     He has no cervical adenopathy.   Neurological: He is alert and oriented to person, place, and time. He has normal reflexes. He displays normal reflexes. No cranial nerve deficit. He exhibits normal muscle tone. Coordination normal.   Skin: Skin is warm and dry. No rash noted. He is " not diaphoretic. No erythema. No pallor.   Psychiatric: He has a normal mood and affect.   Nursing note and vitals reviewed.      Assessment/Plan   Problem List Items Addressed This Visit        Endocrine    Hypothyroidism (acquired)    Relevant Medications    SYNTHROID 125 MCG tablet    Thyroid enlarged    Relevant Medications    SYNTHROID 125 MCG tablet    Other Relevant Orders    US Thyroid       Other    Malaise and fatigue    Relevant Orders    Comprehensive Metabolic Panel    Hemoglobin A1c    Lipid Panel    Vitamin B12    Vitamin D 25 Hydroxy    PSA    Magnesium    General medical exam - Primary    Relevant Orders    Comprehensive Metabolic Panel    Hemoglobin A1c    Lipid Panel    Vitamin B12    Vitamin D 25 Hydroxy    PSA    Magnesium    Low oxygen saturation    Relevant Orders    Ambulatory Referral to Pulmonology    Low testosterone level in male    Relevant Medications    testosterone (ANDRODERM) 2 MG/24HR patch 24 hour 24 hour patch    Other Relevant Orders    Testosterone           New Medications Ordered This Visit   Medications   • SYNTHROID 125 MCG tablet     Sig: Take 1 tablet by mouth Daily.     Dispense:  30 tablet     Refill:  11   • testosterone (ANDRODERM) 2 MG/24HR patch 24 hour 24 hour patch     Sig: Place 1 patch on the skin Every Night.     Dispense:  30 patch     Refill:  2       Patient understands the risks associated with this controlled medication, including tolerance and addiction.  he also agrees to only obtain this medication from me, and not from a another provider, unless that provider is covering for me in my absence.  he also agrees to be compliant in dosing, and not self adjust the dose of medication.  A signed controlled substance agreement is on file, and he has received a controlled substance education sheet at this a previous visit.  he has also signed a consent for treatment with a controlled substance as per [facility] policy. MINNIE was obtained.    It's not just  what you eat, but when you eat  Eat breakfast, and eat smaller meals throughout the day. A healthy breakfast can jumpstart your metabolism, while eating small, healthy meals (rather than the standard three large meals) keeps your energy up.   Avoid eating at night. Try to eat dinner earlier and fast for 14-16 hours until breakfast the next morning. Studies suggest that eating only when you’re most active and giving your digestive system a long break each day may help to regulate weight.

## 2017-11-30 DIAGNOSIS — R79.89 LOW TESTOSTERONE: ICD-10-CM

## 2017-11-30 DIAGNOSIS — E03.9 HYPOTHYROIDISM (ACQUIRED): Primary | ICD-10-CM

## 2017-11-30 LAB — TESTOST SERPL-MCNC: 259 NG/DL (ref 264–916)

## 2017-12-05 ENCOUNTER — HOSPITAL ENCOUNTER (OUTPATIENT)
Dept: ULTRASOUND IMAGING | Facility: HOSPITAL | Age: 47
Discharge: HOME | End: 2017-12-05
Admitting: NURSE PRACTITIONER

## 2017-12-05 PROCEDURE — 76536 US EXAM OF HEAD AND NECK: CPT

## 2017-12-06 ENCOUNTER — TELEPHONE (OUTPATIENT)
Dept: FAMILY MEDICINE CLINIC | Facility: CLINIC | Age: 47
End: 2017-12-06

## 2017-12-06 NOTE — TELEPHONE ENCOUNTER
LANRE Colby, Mr. Carrero has been called with his recent US Thyroid results & recommendations.  Continue his current medications and follow-up as planned or sooner if any problems.      ----- Message from LANRE Angulo sent at 12/6/2017  8:18 AM CST -----  I tried to call him-thyroid us looks good-showed mild swelling which is normal in thyroid disease-make sure he is taking his meds and this will fix the swelling

## 2017-12-15 ENCOUNTER — OFFICE VISIT (OUTPATIENT)
Dept: PULMONOLOGY | Facility: CLINIC | Age: 47
End: 2017-12-15

## 2017-12-15 VITALS
BODY MASS INDEX: 31.99 KG/M2 | HEART RATE: 79 BPM | HEIGHT: 69 IN | OXYGEN SATURATION: 98 % | SYSTOLIC BLOOD PRESSURE: 134 MMHG | WEIGHT: 216 LBS | DIASTOLIC BLOOD PRESSURE: 78 MMHG

## 2017-12-15 DIAGNOSIS — R79.81 LOW OXYGEN SATURATION: Primary | ICD-10-CM

## 2017-12-15 DIAGNOSIS — J44.9 MODERATE COPD (CHRONIC OBSTRUCTIVE PULMONARY DISEASE) (HCC): ICD-10-CM

## 2017-12-15 DIAGNOSIS — Z87.891 PERSONAL HISTORY OF TOBACCO USE, PRESENTING HAZARDS TO HEALTH: ICD-10-CM

## 2017-12-15 PROCEDURE — 94727 GAS DIL/WSHOT DETER LNG VOL: CPT | Performed by: INTERNAL MEDICINE

## 2017-12-15 PROCEDURE — 94060 EVALUATION OF WHEEZING: CPT | Performed by: INTERNAL MEDICINE

## 2017-12-15 PROCEDURE — 94729 DIFFUSING CAPACITY: CPT | Performed by: INTERNAL MEDICINE

## 2017-12-15 PROCEDURE — 99204 OFFICE O/P NEW MOD 45 MIN: CPT | Performed by: INTERNAL MEDICINE

## 2017-12-15 RX ORDER — ALBUTEROL SULFATE 90 UG/1
2 AEROSOL, METERED RESPIRATORY (INHALATION) EVERY 4 HOURS PRN
Qty: 1 INHALER | Refills: 11 | Status: SHIPPED | OUTPATIENT
Start: 2017-12-15 | End: 2019-01-10

## 2017-12-15 NOTE — PROGRESS NOTES
Pulmonary Consultation    Geraldine, LANRE Naqvi,    Thank you for asking me to see Jonathon Carrero for   Chief Complaint   Patient presents with   • Shortness of Breath     ref by Solange Chandler   .    Subjective     History of Present Illness  Jonathon Carrero is a 47 y.o. male with a PMH significant for past tobacco use, obesity, and GERD who presents for evaluation of hypoxia. Pt states he has had issues with wheezing and his pulse ox has been noted to be low when he was at his PCP. He does report that the low sats were noted when he had an URI. He did have a walk for desat the most recent visit and his sats actually improved to 98%. Pt has noted episodes of dyspnea, mostly related to prolonged issues with bronchitis. Pt denies prior lung disease. He denies chest pain, but he does complain of his lower extremities getting cold easily. He also reports new leg cramps which occurs at night, but he denies claudication. Pt did previously run, but he stopped several years ago. He has gained ~20lbs over the past few years, but he denies edema. He did smoke 1ppd for 14yr but quit in 2000. 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 His mother has asthma and his father had lung cancer (smoker).    Review of Systems: History obtained from chart review and the patient.  Review of Systems   Constitutional: Positive for fatigue and unexpected weight change.   HENT: Negative for congestion.    Respiratory: Positive for shortness of breath and wheezing. Negative for cough.    Cardiovascular: Negative for chest pain and leg swelling.   Musculoskeletal: Positive for myalgias.     As described in the HPI. Otherwise, remainder of ROS (14 systems) were negative.    Patient Active Problem List   Diagnosis   • TMJ (temporomandibular joint disorder)   • Malaise and fatigue   • Iron deficiency anemia   • Acquired hypothyroidism   • Overweight   • Acute  gastritis   • GERD (gastroesophageal reflux disease)   • General medical exam   • Hypothyroidism (acquired)   • Low oxygen saturation   • Thyroid enlarged   • Low testosterone level in male   • Personal history of tobacco use, presenting hazards to health   • Moderate COPD (chronic obstructive pulmonary disease)         Current Outpatient Prescriptions:   •  SYNTHROID 125 MCG tablet, Take 1 tablet by mouth Daily., Disp: 30 tablet, Rfl: 11  •  testosterone (ANDRODERM) 2 MG/24HR patch 24 hour 24 hour patch, Place 1 patch on the skin Every Night., Disp: 30 patch, Rfl: 2  •  albuterol (VENTOLIN HFA) 108 (90 Base) MCG/ACT inhaler, Inhale 2 puffs Every 4 (Four) Hours As Needed for Wheezing or Shortness of Air., Disp: 1 inhaler, Rfl: 11    Past Medical History:   Diagnosis Date   • Acquired hypothyroidism    • Contracture of ankle    • Deformity of calcaneus    • GERD (gastroesophageal reflux disease)    • Hiatal hernia    • Iron deficiency anemia    • Malaise and fatigue    • Obesity     bmi 32   • Peroneal tendinitis    • TMJ (temporomandibular joint disorder)      Past Surgical History:   Procedure Laterality Date   • COLONOSCOPY  03/15/2013    Internal and external hemorrhoids found.   • DEBRIDEMENT LEG  12/04/2014    Debridement and repair of peroneal tendon as well as resection of peroneal tubercle right   • ENDOSCOPY  03/15/2013    esophagitis seen. Biopsy taken. A hiatus hernia found in the stomach. Gastritis found in the stomach. Biopsy taken. Normal duodenum.   • ENDOSCOPY  06/16/2011    esophagitis seen. Biopsy taken. A hiatus hernia found in the stomach. Gastritis found in the stomach. Biopsy taken. Normal duodenum.   • ENDOSCOPY N/A 5/26/2017    Procedure: ESOPHAGOGASTRODUODENOSCOPY possible dilation ;  Surgeon: Murali Brower MD;  Location: [facility] ENDOSCOPY;  Service:    • ENTEROSCOPY SMALL BOWEL  03/15/2013    Capsule took greater then 3 hours to pass through stomach. Small bowel appeared normal. Although  "prp was robert.   • HERNIA REPAIR     • LIPOSUCTION ABDOMINAL  2008   • NISSEN FUNDOPLICATION N/A 8/17/2017    Procedure: NISSEN FUNDOPLICATION LAPAROSCOPIC ;  Surgeon: Ozzie Barger MD;  Location: [facility];  Service:      Social History     Social History   • Marital status:      Spouse name: N/A   • Number of children: N/A   • Years of education: N/A     Social History Main Topics   • Smoking status: Former Smoker     Years: 14.00     Quit date: 2000   • Smokeless tobacco: Never Used   • Alcohol use Yes      Comment: seldom   • Drug use: No   • Sexual activity: Defer     Other Topics Concern   • None     Social History Narrative     Family History   Problem Relation Age of Onset   • Diabetes Other    • Heart attack Father           Objective     Blood pressure 134/78, pulse 79, height 175.3 cm (69\"), weight 98 kg (216 lb), SpO2 98 %.  Physical Exam   Constitutional: He is oriented to person, place, and time. Vital signs are normal. He appears well-developed and well-nourished.   HENT:   Head: Normocephalic and atraumatic.   Nose: Nose normal.   Mouth/Throat: Uvula is midline, oropharynx is clear and moist and mucous membranes are normal.   Mallampati 3   Eyes: Conjunctivae, EOM and lids are normal. Pupils are equal, round, and reactive to light.   Neck: Trachea normal and normal range of motion. No tracheal tenderness present. No thyroid mass present.   Cardiovascular: Normal rate, regular rhythm and normal heart sounds.  Exam reveals no gallop.    No murmur heard.  Pulmonary/Chest: Effort normal and breath sounds normal. No respiratory distress. He has no decreased breath sounds. He has no wheezes. He has no rhonchi. Chest wall is not dull to percussion. He exhibits no tenderness.   Abdominal: Soft. Normal appearance and bowel sounds are normal. There is no tenderness.       Vascular Status -  His exam exhibits no right foot edema. His exam exhibits no left foot edema.  Lymphadenopathy:        Head (right " side): No submandibular adenopathy present.        Head (left side): No submandibular adenopathy present.     He has no cervical adenopathy.        Right: No supraclavicular adenopathy present.        Left: No supraclavicular adenopathy present.   Neurological: He is alert and oriented to person, place, and time.   Skin: Skin is warm and dry. No cyanosis. Nails show no clubbing.   Psychiatric: He has a normal mood and affect. His speech is normal and behavior is normal. Judgment normal.   Nursing note and vitals reviewed.      PFTs: 12/15/17  Ratio 68   FVC 3.89/78%  FEV1 2.64/68%  TLC 4.13/61%  DLCO 31.95/103%  Moderate obstruction with significant bronchodilator response.  Mild restriction.  Elevated RV/TLC consistent with air trapping.  Normal diffusing capacity.  No comparative data available.     Radiology (independently reviewed and interpreted by me): CXR 11/12/17 showed NACPD       Assessment/Plan     Jonathon was seen today for shortness of breath.    Diagnoses and all orders for this visit:    Low oxygen saturation  -     Full Pulmonary Function Test With Bronchodilator    Moderate COPD (chronic obstructive pulmonary disease)  -     albuterol (VENTOLIN HFA) 108 (90 Base) MCG/ACT inhaler; Inhale 2 puffs Every 4 (Four) Hours As Needed for Wheezing or Shortness of Air.    Personal history of tobacco use, presenting hazards to health         Discussion/ Recommendations:   PFTs did show moderate COPD with significant bronchodilator response.  There is also a mild element of restriction, but I think this is attributable to body habitus.  His recent chest imaging was unremarkable.  I think he does have underlying affective lung disease, which is most prominent when he developed bronchitis.  He was symptomatic at his last PCP appointment where his saturations were noted to be low.  His oxygen saturation today was normal.  There may also be a component of hypoventilation from being sedentary when those measurements  were taken.  Given that he is fairly asymptomatic at this point, I think it is reasonable to start with albuterol only as needed.    -Start albuterol as needed for dyspnea or wheeze.  Instructed on proper technique.  -Depending on response and frequency of use at the next visit, will decide if escalation to a long-acting bronchodilator is necessary.  -No indication for supplemental oxygen at this time.  -Encouraged him to start more regular exercise to assist with weight loss and conditioning.  -Instructed to call for worsening symptoms or concerns.         Return in about 2 months (around 2/15/2018) for Recheck.      Thank you for allowing me to participate in the care of Jonathon Carrero. Please do not hesitate to contact me with any questions.         This document has been electronically signed by Alana Medley MD on December 15, 2017 1:25 PM      Dictated using Dragon

## 2017-12-20 ENCOUNTER — APPOINTMENT (OUTPATIENT)
Dept: LAB | Facility: HOSPITAL | Age: 47
End: 2017-12-20

## 2017-12-20 LAB — TSH SERPL DL<=0.05 MIU/L-ACNC: 1.21 MIU/ML (ref 0.46–4.68)

## 2017-12-20 PROCEDURE — 84443 ASSAY THYROID STIM HORMONE: CPT | Performed by: NURSE PRACTITIONER

## 2017-12-20 PROCEDURE — 36415 COLL VENOUS BLD VENIPUNCTURE: CPT | Performed by: NURSE PRACTITIONER

## 2017-12-20 PROCEDURE — 84403 ASSAY OF TOTAL TESTOSTERONE: CPT | Performed by: NURSE PRACTITIONER

## 2017-12-21 ENCOUNTER — OFFICE VISIT (OUTPATIENT)
Dept: FAMILY MEDICINE CLINIC | Facility: CLINIC | Age: 47
End: 2017-12-21

## 2017-12-21 VITALS
DIASTOLIC BLOOD PRESSURE: 70 MMHG | WEIGHT: 220 LBS | HEIGHT: 69 IN | BODY MASS INDEX: 32.58 KG/M2 | SYSTOLIC BLOOD PRESSURE: 128 MMHG

## 2017-12-21 DIAGNOSIS — R79.89 LOW TESTOSTERONE LEVEL IN MALE: ICD-10-CM

## 2017-12-21 DIAGNOSIS — E03.9 ACQUIRED HYPOTHYROIDISM: Primary | ICD-10-CM

## 2017-12-21 LAB — TESTOST SERPL-MCNC: 367 NG/DL (ref 264–916)

## 2017-12-21 PROCEDURE — 99213 OFFICE O/P EST LOW 20 MIN: CPT | Performed by: NURSE PRACTITIONER

## 2017-12-21 NOTE — PROGRESS NOTES
Chief Complaint   Patient presents with   • Follow-up     had labs done   • Hypothyroidism     Subjective   Jonathon Carrero is a 47 y.o. male.     HPI Comments: Presents with recheck of health care issues-needs to review labs thyroid and testosterone     Fatigue   This is a recurrent problem. The current episode started more than 1 month ago. The problem occurs constantly. The problem has been gradually worsening. Associated symptoms include fatigue. Pertinent negatives include no anorexia, arthralgias, chest pain, chills, coughing, diaphoresis, fever, joint swelling, myalgias, nausea, numbness, rash, urinary symptoms, vertigo, visual change or weakness. Nothing aggravates the symptoms. Treatments tried: rest, exercise  The treatment provided mild relief.   Thyroid Problem   Presents for follow-up visit. Symptoms include fatigue and weight gain. Patient reports no anxiety, cold intolerance, constipation, depressed mood, diaphoresis, dry skin, hair loss, heat intolerance, leg swelling, menstrual problem, nail problem, palpitations, tremors, visual change or weight loss. The symptoms have been worsening.   Obesity   This is a recurrent problem. The current episode started more than 1 year ago. The problem occurs constantly. The problem has been gradually improving. Associated symptoms include fatigue. Pertinent negatives include no anorexia, arthralgias, chest pain, chills, coughing, diaphoresis, fever, joint swelling, myalgias, nausea, numbness, rash, urinary symptoms, vertigo, visual change or weakness. Nothing aggravates the symptoms. He has tried walking (ymca 3 days a week ) for the symptoms. The treatment provided mild relief.        The following portions of the patient's history were reviewed and updated as appropriate: allergies, current medications, past social history and problem list.    Review of Systems   Constitutional: Positive for activity change, fatigue, unexpected weight change and weight  "gain. Negative for appetite change, chills, diaphoresis, fever and weight loss.        Working out and still not loosing weight    HENT: Negative for dental problem, facial swelling, hearing loss, mouth sores, nosebleeds, rhinorrhea, sinus pressure, sneezing, tinnitus and voice change.         Sore throat hurts to swallow   Eyes: Negative.  Negative for photophobia, pain, discharge, redness, itching and visual disturbance.   Respiratory: Negative.  Negative for apnea, cough, choking, chest tightness, shortness of breath, wheezing and stridor.    Cardiovascular: Negative.  Negative for chest pain, palpitations and leg swelling.   Gastrointestinal: Negative.  Negative for abdominal distention, anal bleeding, anorexia, blood in stool, constipation, nausea and rectal pain.   Endocrine: Negative.  Negative for cold intolerance, heat intolerance, polydipsia, polyphagia and polyuria.   Genitourinary: Negative.  Negative for difficulty urinating, discharge, dysuria, enuresis, frequency, genital sores, hematuria and menstrual problem.   Musculoskeletal: Negative.  Negative for arthralgias, back pain, gait problem, joint swelling, myalgias and neck stiffness.   Skin: Negative.  Negative for color change, pallor and rash.   Allergic/Immunologic: Negative.  Negative for environmental allergies, food allergies and immunocompromised state.   Neurological: Negative.  Negative for dizziness, vertigo, tremors, seizures, syncope, facial asymmetry, speech difficulty, weakness and numbness.   Hematological: Negative.  Negative for adenopathy. Does not bruise/bleed easily.   Psychiatric/Behavioral: Negative.  Negative for agitation, behavioral problems, confusion, decreased concentration, dysphoric mood, hallucinations, self-injury, sleep disturbance and suicidal ideas. The patient is not nervous/anxious and is not hyperactive.    All other systems reviewed and are negative.      Objective   /70  Ht 175.3 cm (69\")  Wt 99.8 kg " (220 lb)  BMI 32.49 kg/m2  Physical Exam   Constitutional: He is oriented to person, place, and time. He appears well-developed and well-nourished. No distress.   HENT:   Head: Normocephalic and atraumatic.   Mouth/Throat: No oropharyngeal exudate.   Thyroid enlargement noted -right worse than left-tsh elevated-patient is taking his meds at this time    Eyes: EOM are normal. Pupils are equal, round, and reactive to light. Right eye exhibits no discharge. Left eye exhibits no discharge. No scleral icterus.   Neck: Normal range of motion. Neck supple. No JVD present. No tracheal deviation present. No thyromegaly present.   Cardiovascular: Normal rate, regular rhythm, normal heart sounds and intact distal pulses.  Exam reveals no gallop and no friction rub.    No murmur heard.  Pulmonary/Chest: Effort normal and breath sounds normal. No stridor. No respiratory distress. He has no wheezes. He has no rales. He exhibits no tenderness.   Abdominal: Soft. Bowel sounds are normal. He exhibits no distension and no mass. There is no tenderness. There is no rebound and no guarding. No hernia.   Musculoskeletal: Normal range of motion. He exhibits no edema, tenderness or deformity.   Lymphadenopathy:     He has no cervical adenopathy.   Neurological: He is alert and oriented to person, place, and time. He has normal reflexes. He displays normal reflexes. No cranial nerve deficit. He exhibits normal muscle tone. Coordination normal.   Skin: Skin is warm and dry. No rash noted. He is not diaphoretic. No erythema. No pallor.   Psychiatric: He has a normal mood and affect.   Nursing note and vitals reviewed.      Assessment/Plan   Problem List Items Addressed This Visit        Endocrine    Acquired hypothyroidism - Primary    Relevant Orders    TSH       Other    Low testosterone level in male    Relevant Medications    testosterone (ANDRODERM) 2 MG/24HR patch 24 hour 24 hour patch           New Medications Ordered This Visit    Medications   • testosterone (ANDRODERM) 2 MG/24HR patch 24 hour 24 hour patch     Sig: Place 1 patch on the skin Every Night.     Dispense:  30 patch     Refill:  2       Patient understands the risks associated with this controlled medication, including tolerance and addiction.  he also agrees to only obtain this medication from me, and not from a another provider, unless that provider is covering for me in my absence.  he also agrees to be compliant in dosing, and not self adjust the dose of medication.  A signed controlled substance agreement is on file, and he has received a controlled substance education sheet at this a previous visit.  he has also signed a consent for treatment with a controlled substance as per [facility] policy. MINNIE was obtained.    Continue meds as directed, no changes

## 2018-12-03 DIAGNOSIS — E03.9 HYPOTHYROIDISM (ACQUIRED): ICD-10-CM

## 2018-12-04 RX ORDER — LEVOTHYROXINE SODIUM 125 MCG
125 TABLET ORAL DAILY
Qty: 30 TABLET | Refills: 0 | Status: SHIPPED | OUTPATIENT
Start: 2018-12-04 | End: 2019-01-10 | Stop reason: SDUPTHER

## 2019-01-04 ENCOUNTER — TELEPHONE (OUTPATIENT)
Dept: FAMILY MEDICINE CLINIC | Facility: CLINIC | Age: 49
End: 2019-01-04

## 2019-01-04 DIAGNOSIS — R53.81 MALAISE: ICD-10-CM

## 2019-01-04 DIAGNOSIS — E03.9 HYPOTHYROIDISM (ACQUIRED): Primary | ICD-10-CM

## 2019-01-04 NOTE — TELEPHONE ENCOUNTER
Patient has called and said he has an appt next week for a checkup. He wants to get labs done before his appt. He is for sure wanting his Iron checked and checked for the thyroid. Along with anything else Solange may want to add. Call him at [phone] and let him know if Solange can put orders in.

## 2019-01-09 ENCOUNTER — APPOINTMENT (OUTPATIENT)
Dept: LAB | Facility: HOSPITAL | Age: 49
End: 2019-01-09

## 2019-01-09 LAB
25(OH)D3 SERPL-MCNC: 55.5 NG/ML (ref 30–100)
ALBUMIN SERPL-MCNC: 4.3 G/DL (ref 3.4–4.8)
ALBUMIN/GLOB SERPL: 1.5 G/DL (ref 1.1–1.8)
ALP SERPL-CCNC: 49 U/L (ref 38–126)
ALT SERPL W P-5'-P-CCNC: 40 U/L (ref 21–72)
ANION GAP SERPL CALCULATED.3IONS-SCNC: 10 MMOL/L (ref 5–15)
ARTICHOKE IGE QN: 94 MG/DL (ref 1–129)
AST SERPL-CCNC: 45 U/L (ref 17–59)
BASOPHILS # BLD AUTO: 0 10*3/MM3 (ref 0–0.2)
BASOPHILS NFR BLD AUTO: 0 % (ref 0–2)
BILIRUB SERPL-MCNC: 0.8 MG/DL (ref 0.2–1.3)
BUN BLD-MCNC: 13 MG/DL (ref 7–21)
BUN/CREAT SERPL: 14 (ref 7–25)
CALCIUM SPEC-SCNC: 9 MG/DL (ref 8.4–10.2)
CHLORIDE SERPL-SCNC: 102 MMOL/L (ref 95–110)
CHOLEST SERPL-MCNC: 156 MG/DL (ref 0–199)
CO2 SERPL-SCNC: 29 MMOL/L (ref 22–31)
CREAT BLD-MCNC: 0.93 MG/DL (ref 0.7–1.3)
DEPRECATED RDW RBC AUTO: 41.3 FL (ref 35.1–43.9)
EOSINOPHIL # BLD AUTO: 0.08 10*3/MM3 (ref 0–0.7)
EOSINOPHIL NFR BLD AUTO: 1.8 % (ref 0–7)
ERYTHROCYTE [DISTWIDTH] IN BLOOD BY AUTOMATED COUNT: 12.7 % (ref 11.5–14.5)
GFR SERPL CREATININE-BSD FRML MDRD: 86 ML/MIN/1.73 (ref 63–147)
GLOBULIN UR ELPH-MCNC: 2.9 GM/DL (ref 2.3–3.5)
GLUCOSE BLD-MCNC: 107 MG/DL (ref 60–100)
HCT VFR BLD AUTO: 45.1 % (ref 39–49)
HDLC SERPL-MCNC: 48 MG/DL (ref 60–200)
HGB BLD-MCNC: 15.6 G/DL (ref 13.7–17.3)
IMM GRANULOCYTES # BLD AUTO: 0.02 10*3/MM3 (ref 0–0.02)
IMM GRANULOCYTES NFR BLD AUTO: 0.5 % (ref 0–0.5)
IRON 24H UR-MRATE: 114 MCG/DL (ref 49–181)
LDLC/HDLC SERPL: 1.96 {RATIO} (ref 0–3.55)
LYMPHOCYTES # BLD AUTO: 1.5 10*3/MM3 (ref 0.6–4.2)
LYMPHOCYTES NFR BLD AUTO: 34.5 % (ref 10–50)
MAGNESIUM SERPL-MCNC: 2.4 MG/DL (ref 1.6–2.3)
MCH RBC QN AUTO: 30.8 PG (ref 26.5–34)
MCHC RBC AUTO-ENTMCNC: 34.6 G/DL (ref 31.5–36.3)
MCV RBC AUTO: 89.1 FL (ref 80–98)
MONOCYTES # BLD AUTO: 0.38 10*3/MM3 (ref 0–0.9)
MONOCYTES NFR BLD AUTO: 8.7 % (ref 0–12)
NEUTROPHILS # BLD AUTO: 2.37 10*3/MM3 (ref 2–8.6)
NEUTROPHILS NFR BLD AUTO: 54.5 % (ref 37–80)
PLATELET # BLD AUTO: 171 10*3/MM3 (ref 150–450)
PMV BLD AUTO: 9.9 FL (ref 8–12)
POTASSIUM BLD-SCNC: 4.4 MMOL/L (ref 3.5–5.1)
PROT SERPL-MCNC: 7.2 G/DL (ref 6.3–8.6)
RBC # BLD AUTO: 5.06 10*6/MM3 (ref 4.37–5.74)
SODIUM BLD-SCNC: 141 MMOL/L (ref 137–145)
TRIGL SERPL-MCNC: 70 MG/DL (ref 20–199)
TSH SERPL DL<=0.05 MIU/L-ACNC: 1.33 MIU/ML (ref 0.46–4.68)
VIT B12 BLD-MCNC: 346 PG/ML (ref 239–931)
WBC NRBC COR # BLD: 4.35 10*3/MM3 (ref 3.2–9.8)

## 2019-01-09 PROCEDURE — 80053 COMPREHEN METABOLIC PANEL: CPT | Performed by: NURSE PRACTITIONER

## 2019-01-09 PROCEDURE — 82607 VITAMIN B-12: CPT | Performed by: NURSE PRACTITIONER

## 2019-01-09 PROCEDURE — 83036 HEMOGLOBIN GLYCOSYLATED A1C: CPT | Performed by: NURSE PRACTITIONER

## 2019-01-09 PROCEDURE — 84443 ASSAY THYROID STIM HORMONE: CPT | Performed by: NURSE PRACTITIONER

## 2019-01-09 PROCEDURE — 36415 COLL VENOUS BLD VENIPUNCTURE: CPT | Performed by: NURSE PRACTITIONER

## 2019-01-09 PROCEDURE — 83735 ASSAY OF MAGNESIUM: CPT | Performed by: NURSE PRACTITIONER

## 2019-01-09 PROCEDURE — 83540 ASSAY OF IRON: CPT | Performed by: NURSE PRACTITIONER

## 2019-01-09 PROCEDURE — 84403 ASSAY OF TOTAL TESTOSTERONE: CPT | Performed by: NURSE PRACTITIONER

## 2019-01-09 PROCEDURE — G0103 PSA SCREENING: HCPCS | Performed by: NURSE PRACTITIONER

## 2019-01-09 PROCEDURE — 80061 LIPID PANEL: CPT | Performed by: NURSE PRACTITIONER

## 2019-01-09 PROCEDURE — 82306 VITAMIN D 25 HYDROXY: CPT | Performed by: NURSE PRACTITIONER

## 2019-01-09 PROCEDURE — 85025 COMPLETE CBC W/AUTO DIFF WBC: CPT | Performed by: NURSE PRACTITIONER

## 2019-01-10 ENCOUNTER — TELEPHONE (OUTPATIENT)
Dept: FAMILY MEDICINE CLINIC | Facility: CLINIC | Age: 49
End: 2019-01-10

## 2019-01-10 ENCOUNTER — OFFICE VISIT (OUTPATIENT)
Dept: FAMILY MEDICINE CLINIC | Facility: CLINIC | Age: 49
End: 2019-01-10

## 2019-01-10 VITALS
BODY MASS INDEX: 32.2 KG/M2 | SYSTOLIC BLOOD PRESSURE: 128 MMHG | DIASTOLIC BLOOD PRESSURE: 80 MMHG | HEIGHT: 69 IN | WEIGHT: 217.4 LBS

## 2019-01-10 DIAGNOSIS — E03.9 HYPOTHYROIDISM (ACQUIRED): ICD-10-CM

## 2019-01-10 DIAGNOSIS — R73.9 HYPERGLYCEMIA: Primary | ICD-10-CM

## 2019-01-10 LAB
HBA1C MFR BLD: 5.6 % (ref 4–5.6)
PSA SERPL-MCNC: 0.38 NG/ML (ref 0–4)
TESTOST SERPL-MCNC: 255 NG/DL (ref 264–916)

## 2019-01-10 PROCEDURE — 99213 OFFICE O/P EST LOW 20 MIN: CPT | Performed by: NURSE PRACTITIONER

## 2019-01-10 RX ORDER — LEVOTHYROXINE SODIUM 125 MCG
125 TABLET ORAL DAILY
Qty: 30 TABLET | Refills: 11 | Status: SHIPPED | OUTPATIENT
Start: 2019-01-10 | End: 2020-01-30 | Stop reason: SDUPTHER

## 2019-01-10 NOTE — PROGRESS NOTES
Chief Complaint   Patient presents with   • Follow-up   • Hypothyroidism     Subjective   Jonathon Carrero is a 48 y.o. male.     Presents with recheck of health care issues-needs to review labs thyroid and testosterone       Hypothyroidism   This is a chronic problem. The problem occurs constantly. The problem has been resolved. Associated symptoms include fatigue. Pertinent negatives include no abdominal pain, anorexia, arthralgias, change in bowel habit, chest pain, chills, congestion, coughing, diaphoresis, fever, headaches, joint swelling, myalgias, nausea, neck pain, numbness, rash, sore throat, swollen glands, urinary symptoms, vertigo, visual change, vomiting or weakness. Treatments tried: synthroid  The treatment provided significant relief.   Fatigue   This is a recurrent problem. The current episode started more than 1 month ago. The problem occurs constantly. The problem has been gradually worsening. Associated symptoms include fatigue. Pertinent negatives include no abdominal pain, anorexia, arthralgias, change in bowel habit, chest pain, chills, congestion, coughing, diaphoresis, fever, headaches, joint swelling, myalgias, nausea, neck pain, numbness, rash, sore throat, swollen glands, urinary symptoms, vertigo, visual change, vomiting or weakness. Nothing aggravates the symptoms. Treatments tried: rest, exercise  The treatment provided mild relief.   Thyroid Problem   Presents for follow-up visit. Symptoms include fatigue and weight gain. Patient reports no anxiety, cold intolerance, constipation, depressed mood, diaphoresis, diarrhea, dry skin, hair loss, heat intolerance, leg swelling, menstrual problem, nail problem, palpitations, tremors, visual change or weight loss. The symptoms have been worsening.   Obesity   This is a recurrent problem. The current episode started more than 1 year ago. The problem occurs constantly. The problem has been gradually improving. Associated symptoms include  fatigue. Pertinent negatives include no abdominal pain, anorexia, arthralgias, change in bowel habit, chest pain, chills, congestion, coughing, diaphoresis, fever, headaches, joint swelling, myalgias, nausea, neck pain, numbness, rash, sore throat, swollen glands, urinary symptoms, vertigo, visual change, vomiting or weakness. Nothing aggravates the symptoms. He has tried walking (ymca 3 days a week ) for the symptoms. The treatment provided mild relief.        The following portions of the patient's history were reviewed and updated as appropriate: allergies, current medications, past social history and problem list.    Review of Systems   Constitutional: Positive for activity change, fatigue, unexpected weight change and weight gain. Negative for appetite change, chills, diaphoresis, fever and weight loss.        Working out and still not loosing weight    HENT: Negative for congestion, dental problem, drooling, ear discharge, ear pain, facial swelling, hearing loss, mouth sores, nosebleeds, rhinorrhea, sinus pressure, sneezing, sore throat, tinnitus and voice change.    Eyes: Negative.  Negative for photophobia, pain, discharge, redness, itching and visual disturbance.   Respiratory: Negative.  Negative for apnea, cough, choking, chest tightness, shortness of breath, wheezing and stridor.    Cardiovascular: Negative.  Negative for chest pain, palpitations and leg swelling.   Gastrointestinal: Negative.  Negative for abdominal distention, abdominal pain, anal bleeding, anorexia, blood in stool, change in bowel habit, constipation, diarrhea, nausea, rectal pain and vomiting.   Endocrine: Negative.  Negative for cold intolerance, heat intolerance, polydipsia, polyphagia and polyuria.   Genitourinary: Negative.  Negative for difficulty urinating, discharge, dysuria, enuresis, frequency, genital sores, hematuria and menstrual problem.   Musculoskeletal: Negative.  Negative for arthralgias, back pain, gait problem,  "joint swelling, myalgias, neck pain and neck stiffness.   Skin: Negative.  Negative for color change, pallor and rash.   Allergic/Immunologic: Negative.  Negative for environmental allergies, food allergies and immunocompromised state.   Neurological: Negative.  Negative for dizziness, vertigo, tremors, seizures, syncope, facial asymmetry, speech difficulty, weakness, numbness and headaches.   Hematological: Negative.  Negative for adenopathy. Does not bruise/bleed easily.   Psychiatric/Behavioral: Negative.  Negative for agitation, behavioral problems, confusion, decreased concentration, dysphoric mood, hallucinations, self-injury, sleep disturbance and suicidal ideas. The patient is not nervous/anxious and is not hyperactive.    All other systems reviewed and are negative.      Objective   /80 (BP Location: Left arm, Patient Position: Sitting)   Ht 175.3 cm (69\")   Wt 98.6 kg (217 lb 6.4 oz)   BMI 32.10 kg/m²   Physical Exam   Constitutional: He is oriented to person, place, and time. He appears well-developed and well-nourished. No distress.   HENT:   Head: Normocephalic and atraumatic.   Right Ear: External ear normal.   Left Ear: External ear normal.   Mouth/Throat: No oropharyngeal exudate.   Eyes: EOM are normal. Pupils are equal, round, and reactive to light. Right eye exhibits no discharge. Left eye exhibits no discharge. No scleral icterus.   Neck: Normal range of motion. Neck supple. No JVD present. No tracheal deviation present. No thyromegaly present.   Cardiovascular: Normal rate, regular rhythm, normal heart sounds and intact distal pulses. Exam reveals no gallop and no friction rub.   No murmur heard.  Pulmonary/Chest: Effort normal and breath sounds normal. No stridor. No respiratory distress. He has no wheezes. He has no rales. He exhibits no tenderness.   Abdominal: Soft. Bowel sounds are normal. He exhibits no distension and no mass. There is no tenderness. There is no rebound and no " guarding. No hernia.   Musculoskeletal: Normal range of motion. He exhibits no edema, tenderness or deformity.   Lymphadenopathy:     He has no cervical adenopathy.   Neurological: He is alert and oriented to person, place, and time. He has normal reflexes. He displays normal reflexes. No cranial nerve deficit or sensory deficit. He exhibits normal muscle tone. Coordination normal.   Skin: Skin is warm and dry. No rash noted. He is not diaphoretic. No erythema. No pallor.   Psychiatric: He has a normal mood and affect.   Nursing note and vitals reviewed.      Assessment/Plan   Problem List Items Addressed This Visit        Endocrine    Hypothyroidism (acquired)    Relevant Medications    SYNTHROID 125 MCG tablet       Other    Hyperglycemia - Primary    Relevant Orders    Hemoglobin A1c (Completed)    PSA Screen (Completed)           New Medications Ordered This Visit   Medications   • SYNTHROID 125 MCG tablet     Sig: Take 1 tablet by mouth Daily.     Dispense:  30 tablet     Refill:  11       It's not just what you eat, but when you eat  Eat breakfast, and eat smaller meals throughout the day. A healthy breakfast can jumpstart your metabolism, while eating small, healthy meals (rather than the standard three large meals) keeps your energy up.   Avoid eating at night. Try to eat dinner earlier and fast for 14-16 hours until breakfast the next morning. Studies suggest that eating only when you’re most active and giving your digestive system a long break each day may help to regulate weight.

## 2019-01-10 NOTE — TELEPHONE ENCOUNTER
LANRE Kerr, Mr. Carrero has been called with his recent lab results & recommendations.  Continue his current medications and follow-up as planned or sooner if any problems.      ----- Message from LANRE Ko sent at 1/10/2019 11:23 AM CST -----  Can you let him know normal labs-this is good news-bs is good and psa is good

## 2019-01-10 NOTE — PROGRESS NOTES
Jessica Chandler, LANRE, Mr. Carrero has been called with his recent lab results & recommendations.  Continue his current medications and follow-up as planned or sooner if any problems.

## 2020-01-30 ENCOUNTER — TELEPHONE (OUTPATIENT)
Dept: FAMILY MEDICINE CLINIC | Facility: CLINIC | Age: 50
End: 2020-01-30

## 2020-01-30 DIAGNOSIS — E03.9 HYPOTHYROIDISM (ACQUIRED): ICD-10-CM

## 2020-01-30 RX ORDER — LEVOTHYROXINE SODIUM 125 MCG
125 TABLET ORAL DAILY
Qty: 30 TABLET | Refills: 11 | Status: SHIPPED | OUTPATIENT
Start: 2020-01-30 | End: 2020-02-12 | Stop reason: SDUPTHER

## 2020-01-30 NOTE — TELEPHONE ENCOUNTER
Jonathon called and made a follow up apt for 2-10 and wondering if he could get a refill on his Synthroid until then because he is out of town.

## 2020-02-10 ENCOUNTER — APPOINTMENT (OUTPATIENT)
Dept: LAB | Facility: HOSPITAL | Age: 50
End: 2020-02-10

## 2020-02-10 ENCOUNTER — OFFICE VISIT (OUTPATIENT)
Dept: FAMILY MEDICINE CLINIC | Facility: CLINIC | Age: 50
End: 2020-02-10

## 2020-02-10 VITALS
OXYGEN SATURATION: 99 % | TEMPERATURE: 97.5 F | WEIGHT: 220 LBS | BODY MASS INDEX: 32.58 KG/M2 | DIASTOLIC BLOOD PRESSURE: 82 MMHG | HEIGHT: 69 IN | SYSTOLIC BLOOD PRESSURE: 120 MMHG

## 2020-02-10 DIAGNOSIS — Z00.00 GENERAL MEDICAL EXAM: ICD-10-CM

## 2020-02-10 DIAGNOSIS — E03.9 ACQUIRED HYPOTHYROIDISM: Primary | ICD-10-CM

## 2020-02-10 DIAGNOSIS — R79.89 LOW TESTOSTERONE LEVEL IN MALE: ICD-10-CM

## 2020-02-10 DIAGNOSIS — Z12.11 ENCOUNTER FOR SCREENING COLONOSCOPY: ICD-10-CM

## 2020-02-10 LAB
25(OH)D3 SERPL-MCNC: 40.5 NG/ML (ref 30–100)
ALBUMIN SERPL-MCNC: 4.8 G/DL (ref 3.5–5.2)
ALBUMIN/GLOB SERPL: 1.8 G/DL
ALP SERPL-CCNC: 53 U/L (ref 39–117)
ALT SERPL W P-5'-P-CCNC: 26 U/L (ref 1–41)
ANION GAP SERPL CALCULATED.3IONS-SCNC: 12 MMOL/L (ref 5–15)
AST SERPL-CCNC: 22 U/L (ref 1–40)
BASOPHILS # BLD AUTO: 0.02 10*3/MM3 (ref 0–0.2)
BASOPHILS NFR BLD AUTO: 0.3 % (ref 0–1.5)
BILIRUB SERPL-MCNC: 0.5 MG/DL (ref 0.2–1.2)
BUN BLD-MCNC: 15 MG/DL (ref 6–20)
BUN/CREAT SERPL: 14.4 (ref 7–25)
CALCIUM SPEC-SCNC: 9.4 MG/DL (ref 8.6–10.5)
CHLORIDE SERPL-SCNC: 102 MMOL/L (ref 98–107)
CHOLEST SERPL-MCNC: 176 MG/DL (ref 0–200)
CO2 SERPL-SCNC: 28 MMOL/L (ref 22–29)
CREAT BLD-MCNC: 1.04 MG/DL (ref 0.76–1.27)
DEPRECATED RDW RBC AUTO: 45.8 FL (ref 37–54)
EOSINOPHIL # BLD AUTO: 0.15 10*3/MM3 (ref 0–0.4)
EOSINOPHIL NFR BLD AUTO: 2.5 % (ref 0.3–6.2)
ERYTHROCYTE [DISTWIDTH] IN BLOOD BY AUTOMATED COUNT: 13.7 % (ref 12.3–15.4)
GFR SERPL CREATININE-BSD FRML MDRD: 76 ML/MIN/1.73
GLOBULIN UR ELPH-MCNC: 2.6 GM/DL
GLUCOSE BLD-MCNC: 104 MG/DL (ref 65–99)
HBA1C MFR BLD: 5.71 % (ref 4.8–5.6)
HCT VFR BLD AUTO: 45.3 % (ref 37.5–51)
HDLC SERPL-MCNC: 48 MG/DL (ref 40–60)
HGB BLD-MCNC: 15.4 G/DL (ref 13–17.7)
IMM GRANULOCYTES # BLD AUTO: 0.02 10*3/MM3 (ref 0–0.05)
IMM GRANULOCYTES NFR BLD AUTO: 0.3 % (ref 0–0.5)
IRON 24H UR-MRATE: 77 MCG/DL (ref 59–158)
LDLC SERPL CALC-MCNC: 110 MG/DL (ref 0–100)
LDLC/HDLC SERPL: 2.29 {RATIO}
LYMPHOCYTES # BLD AUTO: 1.58 10*3/MM3 (ref 0.7–3.1)
LYMPHOCYTES NFR BLD AUTO: 25.9 % (ref 19.6–45.3)
MAGNESIUM SERPL-MCNC: 2.4 MG/DL (ref 1.6–2.6)
MCH RBC QN AUTO: 31.1 PG (ref 26.6–33)
MCHC RBC AUTO-ENTMCNC: 34 G/DL (ref 31.5–35.7)
MCV RBC AUTO: 91.5 FL (ref 79–97)
MONOCYTES # BLD AUTO: 0.44 10*3/MM3 (ref 0.1–0.9)
MONOCYTES NFR BLD AUTO: 7.2 % (ref 5–12)
NEUTROPHILS # BLD AUTO: 3.9 10*3/MM3 (ref 1.7–7)
NEUTROPHILS NFR BLD AUTO: 63.8 % (ref 42.7–76)
NRBC BLD AUTO-RTO: 0.2 /100 WBC (ref 0–0.2)
PLATELET # BLD AUTO: 190 10*3/MM3 (ref 140–450)
PMV BLD AUTO: 10.2 FL (ref 6–12)
POTASSIUM BLD-SCNC: 5 MMOL/L (ref 3.5–5.2)
PROT SERPL-MCNC: 7.4 G/DL (ref 6–8.5)
PSA SERPL-MCNC: 0.51 NG/ML (ref 0–4)
RBC # BLD AUTO: 4.95 10*6/MM3 (ref 4.14–5.8)
SODIUM BLD-SCNC: 142 MMOL/L (ref 136–145)
TESTOST SERPL-MCNC: 247 NG/DL (ref 193–740)
TRIGL SERPL-MCNC: 90 MG/DL (ref 0–150)
TSH SERPL DL<=0.05 MIU/L-ACNC: 2.71 UIU/ML (ref 0.27–4.2)
VIT B12 BLD-MCNC: 529 PG/ML (ref 211–946)
VLDLC SERPL-MCNC: 18 MG/DL (ref 5–40)
WBC NRBC COR # BLD: 6.11 10*3/MM3 (ref 3.4–10.8)

## 2020-02-10 PROCEDURE — 80061 LIPID PANEL: CPT | Performed by: NURSE PRACTITIONER

## 2020-02-10 PROCEDURE — 80053 COMPREHEN METABOLIC PANEL: CPT | Performed by: NURSE PRACTITIONER

## 2020-02-10 PROCEDURE — 84443 ASSAY THYROID STIM HORMONE: CPT | Performed by: NURSE PRACTITIONER

## 2020-02-10 PROCEDURE — 82607 VITAMIN B-12: CPT | Performed by: NURSE PRACTITIONER

## 2020-02-10 PROCEDURE — 84403 ASSAY OF TOTAL TESTOSTERONE: CPT | Performed by: NURSE PRACTITIONER

## 2020-02-10 PROCEDURE — G0103 PSA SCREENING: HCPCS | Performed by: NURSE PRACTITIONER

## 2020-02-10 PROCEDURE — 36415 COLL VENOUS BLD VENIPUNCTURE: CPT | Performed by: NURSE PRACTITIONER

## 2020-02-10 PROCEDURE — 82306 VITAMIN D 25 HYDROXY: CPT | Performed by: NURSE PRACTITIONER

## 2020-02-10 PROCEDURE — 99214 OFFICE O/P EST MOD 30 MIN: CPT | Performed by: NURSE PRACTITIONER

## 2020-02-10 PROCEDURE — 85025 COMPLETE CBC W/AUTO DIFF WBC: CPT | Performed by: NURSE PRACTITIONER

## 2020-02-10 PROCEDURE — 83735 ASSAY OF MAGNESIUM: CPT | Performed by: NURSE PRACTITIONER

## 2020-02-10 PROCEDURE — 83540 ASSAY OF IRON: CPT | Performed by: NURSE PRACTITIONER

## 2020-02-10 PROCEDURE — 83036 HEMOGLOBIN GLYCOSYLATED A1C: CPT | Performed by: NURSE PRACTITIONER

## 2020-02-12 ENCOUNTER — TELEPHONE (OUTPATIENT)
Dept: FAMILY MEDICINE CLINIC | Facility: CLINIC | Age: 50
End: 2020-02-12

## 2020-02-12 DIAGNOSIS — E03.9 HYPOTHYROIDISM (ACQUIRED): ICD-10-CM

## 2020-02-12 RX ORDER — LEVOTHYROXINE SODIUM 125 MCG
125 TABLET ORAL DAILY
Qty: 30 TABLET | Refills: 11 | Status: SHIPPED | OUTPATIENT
Start: 2020-02-12 | End: 2021-01-20 | Stop reason: SDUPTHER

## 2020-02-12 NOTE — PROGRESS NOTES
Per LANRE Mcmillan, Mr. Carrero has been called with recent lab results & recommendations.  Continue current medications and follow-up as planned or sooner if any problems.

## 2020-02-12 NOTE — TELEPHONE ENCOUNTER
Solange,     Mr. Jonathon Carrero said to let you know that he will get the Shingles Vaccine when he comes back in August.  He said he did not want to make a special trip just for the shot.     Thank you  GURJIT Metz

## 2020-02-12 NOTE — TELEPHONE ENCOUNTER
Per LANRE Mcmillan, Mr. Carrero has been called with recent lab results & recommendations.  Continue current medications and follow-up as planned or sooner if any problems.  Refill sent    ----- Message from LANRE Ko sent at 2/11/2020  3:04 PM CST -----  Regarding: FW:  Can you let him know his labs look good. His blood sugar is up a little otherwise labs are good. No changes but clean up his diet.  ----- Message -----  From: Lab, Background User  Sent: 2/10/2020   6:51 PM CST  To: LANRE Ko

## 2020-03-03 ENCOUNTER — OFFICE VISIT (OUTPATIENT)
Dept: GASTROENTEROLOGY | Facility: CLINIC | Age: 50
End: 2020-03-03

## 2020-03-03 VITALS
HEART RATE: 84 BPM | DIASTOLIC BLOOD PRESSURE: 88 MMHG | SYSTOLIC BLOOD PRESSURE: 132 MMHG | BODY MASS INDEX: 32.17 KG/M2 | WEIGHT: 217.2 LBS | HEIGHT: 69 IN

## 2020-03-03 DIAGNOSIS — Z12.11 ENCOUNTER FOR SCREENING FOR MALIGNANT NEOPLASM OF COLON: Primary | ICD-10-CM

## 2020-03-03 PROCEDURE — S0285 CNSLT BEFORE SCREEN COLONOSC: HCPCS | Performed by: NURSE PRACTITIONER

## 2020-03-03 RX ORDER — SODIUM CHLORIDE 0.9 % (FLUSH) 0.9 %
3 SYRINGE (ML) INJECTION EVERY 12 HOURS SCHEDULED
Status: CANCELLED | OUTPATIENT
Start: 2020-03-03

## 2020-03-03 RX ORDER — SODIUM, POTASSIUM,MAG SULFATES 17.5-3.13G
1 SOLUTION, RECONSTITUTED, ORAL ORAL EVERY 12 HOURS
Qty: 2 BOTTLE | Refills: 0 | Status: SHIPPED | OUTPATIENT
Start: 2020-03-03 | End: 2020-03-17

## 2020-03-03 RX ORDER — SODIUM CHLORIDE 0.9 % (FLUSH) 0.9 %
10 SYRINGE (ML) INJECTION AS NEEDED
Status: CANCELLED | OUTPATIENT
Start: 2020-03-03

## 2020-03-03 RX ORDER — DEXTROSE AND SODIUM CHLORIDE 5; .45 G/100ML; G/100ML
30 INJECTION, SOLUTION INTRAVENOUS CONTINUOUS PRN
Status: CANCELLED | OUTPATIENT
Start: 2020-03-10

## 2020-03-03 NOTE — H&P (VIEW-ONLY)
Chief Complaint   Patient presents with   • Colon Cancer Screening       Subjective    Jonathon Carrero is a 49 y.o. male. he is here today for follow-up.    History of Present Illness  49-year-old male presents to discuss screening colonoscopy.  He denies any abdominal pain, nausea, vomiting or change with his bowel habits.  He denies any melena or hematochezia.  Weight is stable.  Previous colonoscopy was completed in 2013 no polyps were identified and unable to review report but colonic biopsy showed no significant histologic abnormality.  He denies any family history of colorectal cancer.  He was previously seen in this office due to reflux and trouble swallowing he underwent Nissen fundoplication 2017 and states he has had no issues with reflux since that time     The following portions of the patient's history were reviewed and updated as appropriate:   Past Medical History:   Diagnosis Date   • Acquired hypothyroidism    • Contracture of ankle    • Deformity of calcaneus    • GERD (gastroesophageal reflux disease)    • Hiatal hernia    • Iron deficiency anemia    • Malaise and fatigue    • Obesity     bmi [id number]   • Peroneal tendinitis    • TMJ (temporomandibular joint disorder)      Past Surgical History:   Procedure Laterality Date   • COLONOSCOPY  03/15/2013    Internal and external hemorrhoids found.   • DEBRIDEMENT LEG  12/04/2014    Debridement and repair of peroneal tendon as well as resection of peroneal tubercle right   • ENDOSCOPY  03/15/2013    esophagitis seen. Biopsy taken. A hiatus hernia found in the stomach. Gastritis found in the stomach. Biopsy taken. Normal duodenum.   • ENDOSCOPY  06/16/2011    esophagitis seen. Biopsy taken. A hiatus hernia found in the stomach. Gastritis found in the stomach. Biopsy taken. Normal duodenum.   • ENDOSCOPY N/A 5/26/2017    Procedure: ESOPHAGOGASTRODUODENOSCOPY possible dilation ;  Surgeon: Murali Brower MD;  Location: [facility] ENDOSCOPY;  Service:    •  ENTEROSCOPY SMALL BOWEL  03/15/2013    Capsule took greater then 3 hours to pass through stomach. Small bowel appeared normal. Although prp was robert.   • HERNIA REPAIR     • LIPOSUCTION ABDOMINAL     • NISSEN FUNDOPLICATION N/A 2017    Procedure: NISSEN FUNDOPLICATION LAPAROSCOPIC ;  Surgeon: Ozzie Barger MD;  Location: [facility];  Service:      Family History   Problem Relation Age of Onset   • Diabetes Other    • Heart attack Father        Prior to Admission medications    Medication Sig Start Date End Date Taking? Authorizing Provider   albuterol sulfate  (90 Base) MCG/ACT inhaler Inhale 2 puffs Every 4 (Four) Hours As Needed for Wheezing. 19  Yes Jhon Higgins,    SYNTHROID 125 MCG tablet Take 1 tablet by mouth Daily. 20  Yes Solange Morin APRN   cetirizine (zyrTEC) 10 MG tablet Take 1 tablet by mouth Daily. 12/7/19 3/3/20 Yes Jhon Higgins DO   fluticasone (FLONASE) 50 MCG/ACT nasal spray 1 spray into the nostril(s) as directed by provider Daily. 12/7/19 3/3/20  Jhon Higgins, DO     No Known Allergies  Social History     Socioeconomic History   • Marital status:      Spouse name: Not on file   • Number of children: Not on file   • Years of education: Not on file   • Highest education level: Not on file   Tobacco Use   • Smoking status: Former Smoker     Years: 14.00     Last attempt to quit: 2000     Years since quittin.1   • Smokeless tobacco: Never Used   Substance and Sexual Activity   • Alcohol use: Yes     Comment: seldom   • Drug use: No   • Sexual activity: Defer       Review of Systems  Review of Systems   Constitutional: Negative for activity change, appetite change, chills, diaphoresis, fatigue, fever and unexpected weight change.   HENT: Negative for sore throat and trouble swallowing.    Respiratory: Negative for shortness of breath.    Gastrointestinal: Negative for abdominal distention, abdominal pain, anal bleeding, blood in stool,  "constipation, diarrhea, nausea, rectal pain and vomiting.   Musculoskeletal: Negative for arthralgias.   Skin: Negative for pallor.   Neurological: Negative for light-headedness.        /88 (BP Location: Left arm)   Pulse 84   Ht 175.3 cm (69\")   Wt 98.5 kg (217 lb 3.2 oz)   BMI 32.07 kg/m²     Objective    Physical Exam   Constitutional: He is oriented to person, place, and time. He appears well-developed and well-nourished. He is cooperative. No distress.   HENT:   Head: Normocephalic and atraumatic.   Neck: Normal range of motion. Neck supple. No thyromegaly present.   Cardiovascular: Normal rate, regular rhythm and normal heart sounds.   Pulmonary/Chest: Effort normal and breath sounds normal. He has no wheezes. He has no rhonchi. He has no rales.   Abdominal: Soft. Normal appearance and bowel sounds are normal. He exhibits no distension. There is no hepatosplenomegaly. There is no tenderness. There is no rigidity and no guarding. No hernia.   Lymphadenopathy:     He has no cervical adenopathy.   Neurological: He is alert and oriented to person, place, and time.   Skin: Skin is warm, dry and intact. No rash noted. No pallor.   Psychiatric: He has a normal mood and affect. His speech is normal.     Office Visit on 02/10/2020   Component Date Value Ref Range Status   • Glucose 02/10/2020 104* 65 - 99 mg/dL Final   • BUN 02/10/2020 15  6 - 20 mg/dL Final   • Creatinine 02/10/2020 1.04  0.76 - 1.27 mg/dL Final   • Sodium 02/10/2020 142  136 - 145 mmol/L Final   • Potassium 02/10/2020 5.0  3.5 - 5.2 mmol/L Final   • Chloride 02/10/2020 102  98 - 107 mmol/L Final   • CO2 02/10/2020 28.0  22.0 - 29.0 mmol/L Final   • Calcium 02/10/2020 9.4  8.6 - 10.5 mg/dL Final   • Total Protein 02/10/2020 7.4  6.0 - 8.5 g/dL Final   • Albumin 02/10/2020 4.80  3.50 - 5.20 g/dL Final   • ALT (SGPT) 02/10/2020 26  1 - 41 U/L Final   • AST (SGOT) 02/10/2020 22  1 - 40 U/L Final   • Alkaline Phosphatase 02/10/2020 53  39 - 117 " U/L Final   • Total Bilirubin 02/10/2020 0.5  0.2 - 1.2 mg/dL Final   • eGFR Non African Amer 02/10/2020 76  >60 mL/min/1.73 Final   • Globulin 02/10/2020 2.6  gm/dL Final   • A/G Ratio 02/10/2020 1.8  g/dL Final   • BUN/Creatinine Ratio 02/10/2020 14.4  7.0 - 25.0 Final   • Anion Gap 02/10/2020 12.0  5.0 - 15.0 mmol/L Final   • Hemoglobin A1C 02/10/2020 5.71* 4.80 - 5.60 % Final   • Iron 02/10/2020 77  59 - 158 mcg/dL Final   • Total Cholesterol 02/10/2020 176  0 - 200 mg/dL Final   • Triglycerides 02/10/2020 90  0 - 150 mg/dL Final   • HDL Cholesterol 02/10/2020 48  40 - 60 mg/dL Final   • LDL Cholesterol  02/10/2020 110* 0 - 100 mg/dL Final   • VLDL Cholesterol 02/10/2020 18  5 - 40 mg/dL Final   • LDL/HDL Ratio 02/10/2020 2.29   Final   • 25 Hydroxy, Vitamin D 02/10/2020 40.5  30.0 - 100.0 ng/ml Final   • Vitamin B-12 02/10/2020 529  211 - 946 pg/mL Final   • TSH 02/10/2020 2.710  0.270 - 4.200 uIU/mL Final   • Magnesium 02/10/2020 2.4  1.6 - 2.6 mg/dL Final   • Testosterone, Total 02/10/2020 247.00  193.00 - 740.00 ng/dL Final   • PSA 02/10/2020 0.505  0.000 - 4.000 ng/mL Final   • WBC 02/10/2020 6.11  3.40 - 10.80 10*3/mm3 Final   • RBC 02/10/2020 4.95  4.14 - 5.80 10*6/mm3 Final   • Hemoglobin 02/10/2020 15.4  13.0 - 17.7 g/dL Final   • Hematocrit 02/10/2020 45.3  37.5 - 51.0 % Final   • MCV 02/10/2020 91.5  79.0 - 97.0 fL Final   • MCH 02/10/2020 31.1  26.6 - 33.0 pg Final   • MCHC 02/10/2020 34.0  31.5 - 35.7 g/dL Final   • RDW 02/10/2020 13.7  12.3 - 15.4 % Final   • RDW-SD 02/10/2020 45.8  37.0 - 54.0 fl Final   • MPV 02/10/2020 10.2  6.0 - 12.0 fL Final   • Platelets 02/10/2020 190  140 - 450 10*3/mm3 Final   • Neutrophil % 02/10/2020 63.8  42.7 - 76.0 % Final   • Lymphocyte % 02/10/2020 25.9  19.6 - 45.3 % Final   • Monocyte % 02/10/2020 7.2  5.0 - 12.0 % Final   • Eosinophil % 02/10/2020 2.5  0.3 - 6.2 % Final   • Basophil % 02/10/2020 0.3  0.0 - 1.5 % Final   • Immature Grans % 02/10/2020 0.3  0.0 -  0.5 % Final   • Neutrophils, Absolute 02/10/2020 3.90  1.70 - 7.00 10*3/mm3 Final   • Lymphocytes, Absolute 02/10/2020 1.58  0.70 - 3.10 10*3/mm3 Final   • Monocytes, Absolute 02/10/2020 0.44  0.10 - 0.90 10*3/mm3 Final   • Eosinophils, Absolute 02/10/2020 0.15  0.00 - 0.40 10*3/mm3 Final   • Basophils, Absolute 02/10/2020 0.02  0.00 - 0.20 10*3/mm3 Final   • Immature Grans, Absolute 02/10/2020 0.02  0.00 - 0.05 10*3/mm3 Final   • nRBC 02/10/2020 0.2  0.0 - 0.2 /100 WBC Final     Assessment/Plan      1. Encounter for screening for malignant neoplasm of colon    .   Schedule patient for screening colonoscopy discussed the importance of good prep he requested Tuesday  appointment.  Follow-up after test if needed    Orders placed during this encounter include:  Orders Placed This Encounter   Procedures   • Follow Anesthesia Guidelines / Standing Orders     Standing Status:   Future   • Obtain Informed Consent     Standing Status:   Future     Order Specific Question:   Informed Consent Given For     Answer:   COLONOSCOPY       COLONOSCOPY Request Tuesday appt (N/A)    Review and/or summary of lab tests, radiology, procedures, medications. Review and summary of old records and obtaining of history. The risks and benefits of my recommendations, as well as other treatment options were discussed with the patient today. Questions were answered.    New Medications Ordered This Visit   Medications   • sodium-potassium-magnesium sulfates (SUPREP BOWEL PREP KIT) 17.5-3.13-1.6 GM/177ML solution oral solution     Sig: Take 1 bottle by mouth Every 12 (Twelve) Hours.     Dispense:  2 bottle     Refill:  0       Follow-up: Return for Recheck, After test.          This document has been electronically signed by LANRE Choe on March 3, 2020 10:38 AM             Results for orders placed or performed in visit on 02/10/20   PSA Screen   Result Value Ref Range    PSA 0.505 0.000 - 4.000 ng/mL   CBC Auto Differential   Result Value  Ref Range    WBC 6.11 3.40 - 10.80 10*3/mm3    RBC 4.95 4.14 - 5.80 10*6/mm3    Hemoglobin 15.4 13.0 - 17.7 g/dL    Hematocrit 45.3 37.5 - 51.0 %    MCV 91.5 79.0 - 97.0 fL    MCH 31.1 26.6 - 33.0 pg    MCHC 34.0 31.5 - 35.7 g/dL    RDW 13.7 12.3 - 15.4 %    RDW-SD 45.8 37.0 - 54.0 fl    MPV 10.2 6.0 - 12.0 fL    Platelets 190 140 - 450 10*3/mm3    Neutrophil % 63.8 42.7 - 76.0 %    Lymphocyte % 25.9 19.6 - 45.3 %    Monocyte % 7.2 5.0 - 12.0 %    Eosinophil % 2.5 0.3 - 6.2 %    Basophil % 0.3 0.0 - 1.5 %    Immature Grans % 0.3 0.0 - 0.5 %    Neutrophils, Absolute 3.90 1.70 - 7.00 10*3/mm3    Lymphocytes, Absolute 1.58 0.70 - 3.10 10*3/mm3    Monocytes, Absolute 0.44 0.10 - 0.90 10*3/mm3    Eosinophils, Absolute 0.15 0.00 - 0.40 10*3/mm3    Basophils, Absolute 0.02 0.00 - 0.20 10*3/mm3    Immature Grans, Absolute 0.02 0.00 - 0.05 10*3/mm3    nRBC 0.2 0.0 - 0.2 /100 WBC   Vitamin D 25 Hydroxy   Result Value Ref Range    25 Hydroxy, Vitamin D 40.5 30.0 - 100.0 ng/ml   TSH   Result Value Ref Range    TSH 2.710 0.270 - 4.200 uIU/mL   Testosterone   Result Value Ref Range    Testosterone, Total 247.00 193.00 - 740.00 ng/dL   Magnesium   Result Value Ref Range    Magnesium 2.4 1.6 - 2.6 mg/dL   Iron   Result Value Ref Range    Iron 77 59 - 158 mcg/dL   Hemoglobin A1c   Result Value Ref Range    Hemoglobin A1C 5.71 (H) 4.80 - 5.60 %   Vitamin B12   Result Value Ref Range    Vitamin B-12 529 211 - 946 pg/mL   Lipid Panel   Result Value Ref Range    Total Cholesterol 176 0 - 200 mg/dL    Triglycerides 90 0 - 150 mg/dL    HDL Cholesterol 48 40 - 60 mg/dL    LDL Cholesterol  110 (H) 0 - 100 mg/dL    VLDL Cholesterol 18 5 - 40 mg/dL    LDL/HDL Ratio 2.29    Comprehensive Metabolic Panel   Result Value Ref Range    Glucose 104 (H) 65 - 99 mg/dL    BUN 15 6 - 20 mg/dL    Creatinine 1.04 0.76 - 1.27 mg/dL    Sodium 142 136 - 145 mmol/L    Potassium 5.0 3.5 - 5.2 mmol/L    Chloride 102 98 - 107 mmol/L    CO2 28.0 22.0 - 29.0  mmol/L    Calcium 9.4 8.6 - 10.5 mg/dL    Total Protein 7.4 6.0 - 8.5 g/dL    Albumin 4.80 3.50 - 5.20 g/dL    ALT (SGPT) 26 1 - 41 U/L    AST (SGOT) 22 1 - 40 U/L    Alkaline Phosphatase 53 39 - 117 U/L    Total Bilirubin 0.5 0.2 - 1.2 mg/dL    eGFR Non African Amer 76 >60 mL/min/1.73    Globulin 2.6 gm/dL    A/G Ratio 1.8 g/dL    BUN/Creatinine Ratio 14.4 7.0 - 25.0    Anion Gap 12.0 5.0 - 15.0 mmol/L   Results for orders placed or performed in visit on 01/10/19   PSA Screen   Result Value Ref Range    PSA 0.377 0.000 - 4.000 ng/mL   Hemoglobin A1c   Result Value Ref Range    Hemoglobin A1C 5.6 4 - 5.6 %   Results for orders placed or performed in visit on 01/04/19   CBC Auto Differential   Result Value Ref Range    WBC 4.35 3.20 - 9.80 10*3/mm3    RBC 5.06 4.37 - 5.74 10*6/mm3    Hemoglobin 15.6 13.7 - 17.3 g/dL    Hematocrit 45.1 39.0 - 49.0 %    MCV 89.1 80.0 - 98.0 fL    MCH 30.8 26.5 - 34.0 pg    MCHC 34.6 31.5 - 36.3 g/dL    RDW 12.7 11.5 - 14.5 %    RDW-SD 41.3 35.1 - 43.9 fl    MPV 9.9 8.0 - 12.0 fL    Platelets 171 150 - 450 10*3/mm3    Neutrophil % 54.5 37.0 - 80.0 %    Lymphocyte % 34.5 10.0 - 50.0 %    Monocyte % 8.7 0.0 - 12.0 %    Eosinophil % 1.8 0.0 - 7.0 %    Basophil % 0.0 0.0 - 2.0 %    Immature Grans % 0.5 0.0 - 0.5 %    Neutrophils, Absolute 2.37 2.00 - 8.60 10*3/mm3    Lymphocytes, Absolute 1.50 0.60 - 4.20 10*3/mm3    Monocytes, Absolute 0.38 0.00 - 0.90 10*3/mm3    Eosinophils, Absolute 0.08 0.00 - 0.70 10*3/mm3    Basophils, Absolute 0.00 0.00 - 0.20 10*3/mm3    Immature Grans, Absolute 0.02 0.00 - 0.02 10*3/mm3   Vitamin D 25 Hydroxy   Result Value Ref Range    25 Hydroxy, Vitamin D 55.5 30.0 - 100.0 ng/ml   TSH   Result Value Ref Range    TSH 1.330 0.460 - 4.680 mIU/mL   Testosterone   Result Value Ref Range    Testosterone, Total 255 (L) 264 - 916 ng/dL   Magnesium   Result Value Ref Range    Magnesium 2.4 (H) 1.6 - 2.3 mg/dL   Iron   Result Value Ref Range    Iron 114 49 - 181 mcg/dL      Vitamin B12   Result Value Ref Range    Vitamin B-12 346 239 - 931 pg/mL   Lipid Panel   Result Value Ref Range    Total Cholesterol 156 0 - 199 mg/dL    Triglycerides 70 20 - 199 mg/dL    HDL Cholesterol 48 (L) 60 - 200 mg/dL    LDL Cholesterol  94 1 - 129 mg/dL    LDL/HDL Ratio 1.96 0.00 - 3.55   Comprehensive Metabolic Panel   Result Value Ref Range    Glucose 107 (H) 60 - 100 mg/dL    BUN 13 7 - 21 mg/dL    Creatinine 0.93 0.70 - 1.30 mg/dL    Sodium 141 137 - 145 mmol/L    Potassium 4.4 3.5 - 5.1 mmol/L    Chloride 102 95 - 110 mmol/L    CO2 29.0 22.0 - 31.0 mmol/L    Calcium 9.0 8.4 - 10.2 mg/dL    Total Protein 7.2 6.3 - 8.6 g/dL    Albumin 4.30 3.40 - 4.80 g/dL    ALT (SGPT) 40 21 - 72 U/L    AST (SGOT) 45 17 - 59 U/L    Alkaline Phosphatase 49 38 - 126 U/L    Total Bilirubin 0.8 0.2 - 1.3 mg/dL    eGFR Non  Amer 86 63 - 147 mL/min/1.73    Globulin 2.9 2.3 - 3.5 gm/dL    A/G Ratio 1.5 1.1 - 1.8 g/dL    BUN/Creatinine Ratio 14.0 7.0 - 25.0    Anion Gap 10.0 5.0 - 15.0 mmol/L     *Note: Due to a large number of results and/or encounters for the requested time period, some results have not been displayed. A complete set of results can be found in Results Review.

## 2020-03-03 NOTE — PATIENT INSTRUCTIONS
Colorectal Cancer Screening    Colorectal cancer screening is a group of tests that are used to check for colorectal cancer before symptoms develop. Colorectal refers to the colon and rectum. The colon and rectum are located at the end of the digestive tract and carry bowel movements out of the body.  Who should have screening?  All adults starting at age 50 until age 75 should have screening. Your health care provider may recommend screening at age 45. You will have tests every 1-10 years, depending on your results and the type of screening test.  You may have screening tests starting at an earlier age, or more frequently than other people, if you have any of the following risk factors:  · A personal or family history of colorectal cancer or abnormal growths (polyps).  · Inflammatory bowel disease, such as ulcerative colitis or Crohn's disease.  · A history of having radiation treatment to the abdomen or pelvic area for cancer.  · Colorectal cancer symptoms, such as changes in bowel habits or blood in your stool.  · A type of colon cancer syndrome that is passed from parent to child (hereditary), such as:  ? Horn syndrome.  ? Familial adenomatous polyposis.  ? Turcot syndrome.  ? Peutz-Jeghers syndrome.  Screening recommendations for adults who are 75-85 years old vary depending on health.  How is screening done?  There are several types of colorectal screening tests. You may have one or more of the following:  · Guaiac-based fecal occult blood testing. For this test, a stool (feces) sample is checked for hidden (occult) blood, which could be a sign of colorectal cancer.  · Fecal immunochemical test (FIT). For this test, a stool sample is checked for blood, which could be a sign of colorectal cancer.  · Stool DNA test. For this test, a stool sample is checked for blood and changes in DNA that could lead to colorectal cancer.  · Sigmoidoscopy. During this test, a thin, flexible tube with a camera on the end  (sigmoidoscope) is used to examine the rectum and the lower colon.  · Colonoscopy. During this test, a long, flexible tube with a camera on the end (colonoscope) is used to examine the entire colon and rectum. With a colonoscopy, it is possible to take a sample of tissue (biopsy) and remove small polyps during the test.  · Virtual colonoscopy. Instead of a colonoscope, this type of colonoscopy uses X-rays (CT scan) and computers to produce images of the colon and rectum.  What are the benefits of screening?  Screening reduces your risk for colorectal cancer and can help identify cancer at an early stage, when the cancer can be removed or treated more easily. It is common for polyps to form in the lining of the colon, especially as you age. These polyps may be cancerous or become cancerous over time. Screening can identify these polyps.  What are the risks of screening?  Each screening test may have different risks.  · Stool sample tests have fewer risks than other types of screening tests. However, you may need more tests to confirm results from a stool sample test.  · Screening tests that involve X-rays expose you to low levels of radiation, which may slightly increase your cancer risk. The benefit of detecting cancer outweighs the slight increase in risk.  · Screening tests such as sigmoidoscopy and colonoscopy may place you at risk for bleeding, intestinal damage, infection, or a reaction to medicines given during the exam.  Talk with your health care provider to understand your risk for colorectal cancer and to make a screening plan that is right for you.  Questions to ask your health care provider  · When should I start colorectal cancer screening?  · What is my risk for colorectal cancer?  · How often do I need screening?  · Which screening tests do I need?  · How do I get my test results?  · What do my results mean?  Where to find more information  Learn more about colorectal cancer screening from:  · The  American Cancer Society: www.cancer.org  · The National Cancer [place]: www.cancer.gov  Summary  · Colorectal cancer screening is a group of tests used to check for colorectal cancer before symptoms develop.  · Screening reduces your risk for colorectal cancer and can help identify cancer at an early stage, when the cancer can be removed or treated more easily.  · All adults starting at age 50 until age 75 should have screening. Your health care provider may recommend screening at age 45.  · You may have screening tests starting at an earlier age, or more frequently than other people, if you have certain risk factors.  · Talk with your health care provider to understand your risk for colorectal cancer and to make a screening plan that is right for you.  This information is not intended to replace advice given to you by your health care provider. Make sure you discuss any questions you have with your health care provider.  Document Released: 06/07/2011 Document Revised: 11/30/2018 Document Reviewed: 09/19/2018  [employer] Interactive Patient Education © 2020 [employer]

## 2020-03-03 NOTE — PROGRESS NOTES
Chief Complaint   Patient presents with   • Colon Cancer Screening       Subjective    Jonathon Carrero is a 49 y.o. male. he is here today for follow-up.    History of Present Illness  49-year-old male presents to discuss screening colonoscopy.  He denies any abdominal pain, nausea, vomiting or change with his bowel habits.  He denies any melena or hematochezia.  Weight is stable.  Previous colonoscopy was completed in 2013 no polyps were identified and unable to review report but colonic biopsy showed no significant histologic abnormality.  He denies any family history of colorectal cancer.  He was previously seen in this office due to reflux and trouble swallowing he underwent Nissen fundoplication 2017 and states he has had no issues with reflux since that time     The following portions of the patient's history were reviewed and updated as appropriate:   Past Medical History:   Diagnosis Date   • Acquired hypothyroidism    • Contracture of ankle    • Deformity of calcaneus    • GERD (gastroesophageal reflux disease)    • Hiatal hernia    • Iron deficiency anemia    • Malaise and fatigue    • Obesity     bmi 32   • Peroneal tendinitis    • TMJ (temporomandibular joint disorder)      Past Surgical History:   Procedure Laterality Date   • COLONOSCOPY  03/15/2013    Internal and external hemorrhoids found.   • DEBRIDEMENT LEG  12/04/2014    Debridement and repair of peroneal tendon as well as resection of peroneal tubercle right   • ENDOSCOPY  03/15/2013    esophagitis seen. Biopsy taken. A hiatus hernia found in the stomach. Gastritis found in the stomach. Biopsy taken. Normal duodenum.   • ENDOSCOPY  06/16/2011    esophagitis seen. Biopsy taken. A hiatus hernia found in the stomach. Gastritis found in the stomach. Biopsy taken. Normal duodenum.   • ENDOSCOPY N/A 5/26/2017    Procedure: ESOPHAGOGASTRODUODENOSCOPY possible dilation ;  Surgeon: Murali Brower MD;  Location: [facility] [place] ENDOSCOPY;  Service:    •  ENTEROSCOPY SMALL BOWEL  03/15/2013    Capsule took greater then 3 hours to pass through stomach. Small bowel appeared normal. Although prp was robert.   • HERNIA REPAIR     • LIPOSUCTION ABDOMINAL     • NISSEN FUNDOPLICATION N/A 2017    Procedure: NISSEN FUNDOPLICATION LAPAROSCOPIC ;  Surgeon: Ozzie Barger MD;  Location: [facility];  Service:      Family History   Problem Relation Age of Onset   • Diabetes Other    • Heart attack Father        Prior to Admission medications    Medication Sig Start Date End Date Taking? Authorizing Provider   albuterol sulfate  (90 Base) MCG/ACT inhaler Inhale 2 puffs Every 4 (Four) Hours As Needed for Wheezing. 19  Yes Jhon Higgins,    SYNTHROID 125 MCG tablet Take 1 tablet by mouth Daily. 20  Yes Solange Morin APRN   cetirizine (zyrTEC) 10 MG tablet Take 1 tablet by mouth Daily. 12/7/19 3/3/20 Yes Jhon Higgins DO   fluticasone (FLONASE) 50 MCG/ACT nasal spray 1 spray into the nostril(s) as directed by provider Daily. 12/7/19 3/3/20  Jhon Higgins, DO     No Known Allergies  Social History     Socioeconomic History   • Marital status:      Spouse name: Not on file   • Number of children: Not on file   • Years of education: Not on file   • Highest education level: Not on file   Tobacco Use   • Smoking status: Former Smoker     Years: 14.00     Last attempt to quit: 2000     Years since quittin.1   • Smokeless tobacco: Never Used   Substance and Sexual Activity   • Alcohol use: Yes     Comment: seldom   • Drug use: No   • Sexual activity: Defer       Review of Systems  Review of Systems   Constitutional: Negative for activity change, appetite change, chills, diaphoresis, fatigue, fever and unexpected weight change.   HENT: Negative for sore throat and trouble swallowing.    Respiratory: Negative for shortness of breath.    Gastrointestinal: Negative for abdominal distention, abdominal pain, anal bleeding, blood in stool,  "constipation, diarrhea, nausea, rectal pain and vomiting.   Musculoskeletal: Negative for arthralgias.   Skin: Negative for pallor.   Neurological: Negative for light-headedness.        /88 (BP Location: Left arm)   Pulse 84   Ht 175.3 cm (69\")   Wt 98.5 kg (217 lb 3.2 oz)   BMI 32.07 kg/m²     Objective    Physical Exam   Constitutional: He is oriented to person, place, and time. He appears well-developed and well-nourished. He is cooperative. No distress.   HENT:   Head: Normocephalic and atraumatic.   Neck: Normal range of motion. Neck supple. No thyromegaly present.   Cardiovascular: Normal rate, regular rhythm and normal heart sounds.   Pulmonary/Chest: Effort normal and breath sounds normal. He has no wheezes. He has no rhonchi. He has no rales.   Abdominal: Soft. Normal appearance and bowel sounds are normal. He exhibits no distension. There is no hepatosplenomegaly. There is no tenderness. There is no rigidity and no guarding. No hernia.   Lymphadenopathy:     He has no cervical adenopathy.   Neurological: He is alert and oriented to person, place, and time.   Skin: Skin is warm, dry and intact. No rash noted. No pallor.   Psychiatric: He has a normal mood and affect. His speech is normal.     Office Visit on 02/10/2020   Component Date Value Ref Range Status   • Glucose 02/10/2020 104* 65 - 99 mg/dL Final   • BUN 02/10/2020 15  6 - 20 mg/dL Final   • Creatinine 02/10/2020 1.04  0.76 - 1.27 mg/dL Final   • Sodium 02/10/2020 142  136 - 145 mmol/L Final   • Potassium 02/10/2020 5.0  3.5 - 5.2 mmol/L Final   • Chloride 02/10/2020 102  98 - 107 mmol/L Final   • CO2 02/10/2020 28.0  22.0 - 29.0 mmol/L Final   • Calcium 02/10/2020 9.4  8.6 - 10.5 mg/dL Final   • Total Protein 02/10/2020 7.4  6.0 - 8.5 g/dL Final   • Albumin 02/10/2020 4.80  3.50 - 5.20 g/dL Final   • ALT (SGPT) 02/10/2020 26  1 - 41 U/L Final   • AST (SGOT) 02/10/2020 22  1 - 40 U/L Final   • Alkaline Phosphatase 02/10/2020 53  39 - 117 " U/L Final   • Total Bilirubin 02/10/2020 0.5  0.2 - 1.2 mg/dL Final   • eGFR Non African Amer 02/10/2020 76  >60 mL/min/1.73 Final   • Globulin 02/10/2020 2.6  gm/dL Final   • A/G Ratio 02/10/2020 1.8  g/dL Final   • BUN/Creatinine Ratio 02/10/2020 14.4  7.0 - 25.0 Final   • Anion Gap 02/10/2020 12.0  5.0 - 15.0 mmol/L Final   • Hemoglobin A1C 02/10/2020 5.71* 4.80 - 5.60 % Final   • Iron 02/10/2020 77  59 - 158 mcg/dL Final   • Total Cholesterol 02/10/2020 176  0 - 200 mg/dL Final   • Triglycerides 02/10/2020 90  0 - 150 mg/dL Final   • HDL Cholesterol 02/10/2020 48  40 - 60 mg/dL Final   • LDL Cholesterol  02/10/2020 110* 0 - 100 mg/dL Final   • VLDL Cholesterol 02/10/2020 18  5 - 40 mg/dL Final   • LDL/HDL Ratio 02/10/2020 2.29   Final   • 25 Hydroxy, Vitamin D 02/10/2020 40.5  30.0 - 100.0 ng/ml Final   • Vitamin B-12 02/10/2020 529  211 - 946 pg/mL Final   • TSH 02/10/2020 2.710  0.270 - 4.200 uIU/mL Final   • Magnesium 02/10/2020 2.4  1.6 - 2.6 mg/dL Final   • Testosterone, Total 02/10/2020 247.00  193.00 - 740.00 ng/dL Final   • PSA 02/10/2020 0.505  0.000 - 4.000 ng/mL Final   • WBC 02/10/2020 6.11  3.40 - 10.80 10*3/mm3 Final   • RBC 02/10/2020 4.95  4.14 - 5.80 10*6/mm3 Final   • Hemoglobin 02/10/2020 15.4  13.0 - 17.7 g/dL Final   • Hematocrit 02/10/2020 45.3  37.5 - 51.0 % Final   • MCV 02/10/2020 91.5  79.0 - 97.0 fL Final   • MCH 02/10/2020 31.1  26.6 - 33.0 pg Final   • MCHC 02/10/2020 34.0  31.5 - 35.7 g/dL Final   • RDW 02/10/2020 13.7  12.3 - 15.4 % Final   • RDW-SD 02/10/2020 45.8  37.0 - 54.0 fl Final   • MPV 02/10/2020 10.2  6.0 - 12.0 fL Final   • Platelets 02/10/2020 190  140 - 450 10*3/mm3 Final   • Neutrophil % 02/10/2020 63.8  42.7 - 76.0 % Final   • Lymphocyte % 02/10/2020 25.9  19.6 - 45.3 % Final   • Monocyte % 02/10/2020 7.2  5.0 - 12.0 % Final   • Eosinophil % 02/10/2020 2.5  0.3 - 6.2 % Final   • Basophil % 02/10/2020 0.3  0.0 - 1.5 % Final   • Immature Grans % 02/10/2020 0.3  0.0 -  0.5 % Final   • Neutrophils, Absolute 02/10/2020 3.90  1.70 - 7.00 10*3/mm3 Final   • Lymphocytes, Absolute 02/10/2020 1.58  0.70 - 3.10 10*3/mm3 Final   • Monocytes, Absolute 02/10/2020 0.44  0.10 - 0.90 10*3/mm3 Final   • Eosinophils, Absolute 02/10/2020 0.15  0.00 - 0.40 10*3/mm3 Final   • Basophils, Absolute 02/10/2020 0.02  0.00 - 0.20 10*3/mm3 Final   • Immature Grans, Absolute 02/10/2020 0.02  0.00 - 0.05 10*3/mm3 Final   • nRBC 02/10/2020 0.2  0.0 - 0.2 /100 WBC Final     Assessment/Plan      1. Encounter for screening for malignant neoplasm of colon    .   Schedule patient for screening colonoscopy discussed the importance of good prep he requested Tuesday  appointment.  Follow-up after test if needed    Orders placed during this encounter include:  Orders Placed This Encounter   Procedures   • Follow Anesthesia Guidelines / Standing Orders     Standing Status:   Future   • Obtain Informed Consent     Standing Status:   Future     Order Specific Question:   Informed Consent Given For     Answer:   COLONOSCOPY       COLONOSCOPY Request Tuesday appt (N/A)    Review and/or summary of lab tests, radiology, procedures, medications. Review and summary of old records and obtaining of history. The risks and benefits of my recommendations, as well as other treatment options were discussed with the patient today. Questions were answered.    New Medications Ordered This Visit   Medications   • sodium-potassium-magnesium sulfates (SUPREP BOWEL PREP KIT) 17.5-3.13-1.6 GM/177ML solution oral solution     Sig: Take 1 bottle by mouth Every 12 (Twelve) Hours.     Dispense:  2 bottle     Refill:  0       Follow-up: Return for Recheck, After test.          This document has been electronically signed by LANRE Choe on March 3, 2020 10:38 AM             Results for orders placed or performed in visit on 02/10/20   PSA Screen   Result Value Ref Range    PSA 0.505 0.000 - 4.000 ng/mL   CBC Auto Differential   Result Value  Ref Range    WBC 6.11 3.40 - 10.80 10*3/mm3    RBC 4.95 4.14 - 5.80 10*6/mm3    Hemoglobin 15.4 13.0 - 17.7 g/dL    Hematocrit 45.3 37.5 - 51.0 %    MCV 91.5 79.0 - 97.0 fL    MCH 31.1 26.6 - 33.0 pg    MCHC 34.0 31.5 - 35.7 g/dL    RDW 13.7 12.3 - 15.4 %    RDW-SD 45.8 37.0 - 54.0 fl    MPV 10.2 6.0 - 12.0 fL    Platelets 190 140 - 450 10*3/mm3    Neutrophil % 63.8 42.7 - 76.0 %    Lymphocyte % 25.9 19.6 - 45.3 %    Monocyte % 7.2 5.0 - 12.0 %    Eosinophil % 2.5 0.3 - 6.2 %    Basophil % 0.3 0.0 - 1.5 %    Immature Grans % 0.3 0.0 - 0.5 %    Neutrophils, Absolute 3.90 1.70 - 7.00 10*3/mm3    Lymphocytes, Absolute 1.58 0.70 - 3.10 10*3/mm3    Monocytes, Absolute 0.44 0.10 - 0.90 10*3/mm3    Eosinophils, Absolute 0.15 0.00 - 0.40 10*3/mm3    Basophils, Absolute 0.02 0.00 - 0.20 10*3/mm3    Immature Grans, Absolute 0.02 0.00 - 0.05 10*3/mm3    nRBC 0.2 0.0 - 0.2 /100 WBC   Vitamin D 25 Hydroxy   Result Value Ref Range    25 Hydroxy, Vitamin D 40.5 30.0 - 100.0 ng/ml   TSH   Result Value Ref Range    TSH 2.710 0.270 - 4.200 uIU/mL   Testosterone   Result Value Ref Range    Testosterone, Total 247.00 193.00 - 740.00 ng/dL   Magnesium   Result Value Ref Range    Magnesium 2.4 1.6 - 2.6 mg/dL   Iron   Result Value Ref Range    Iron 77 59 - 158 mcg/dL   Hemoglobin A1c   Result Value Ref Range    Hemoglobin A1C 5.71 (H) 4.80 - 5.60 %   Vitamin B12   Result Value Ref Range    Vitamin B-12 529 211 - 946 pg/mL   Lipid Panel   Result Value Ref Range    Total Cholesterol 176 0 - 200 mg/dL    Triglycerides 90 0 - 150 mg/dL    HDL Cholesterol 48 40 - 60 mg/dL    LDL Cholesterol  110 (H) 0 - 100 mg/dL    VLDL Cholesterol 18 5 - 40 mg/dL    LDL/HDL Ratio 2.29    Comprehensive Metabolic Panel   Result Value Ref Range    Glucose 104 (H) 65 - 99 mg/dL    BUN 15 6 - 20 mg/dL    Creatinine 1.04 0.76 - 1.27 mg/dL    Sodium 142 136 - 145 mmol/L    Potassium 5.0 3.5 - 5.2 mmol/L    Chloride 102 98 - 107 mmol/L    CO2 28.0 22.0 - 29.0  mmol/L    Calcium 9.4 8.6 - 10.5 mg/dL    Total Protein 7.4 6.0 - 8.5 g/dL    Albumin 4.80 3.50 - 5.20 g/dL    ALT (SGPT) 26 1 - 41 U/L    AST (SGOT) 22 1 - 40 U/L    Alkaline Phosphatase 53 39 - 117 U/L    Total Bilirubin 0.5 0.2 - 1.2 mg/dL    eGFR Non African Amer 76 >60 mL/min/1.73    Globulin 2.6 gm/dL    A/G Ratio 1.8 g/dL    BUN/Creatinine Ratio 14.4 7.0 - 25.0    Anion Gap 12.0 5.0 - 15.0 mmol/L   Results for orders placed or performed in visit on 01/10/19   PSA Screen   Result Value Ref Range    PSA 0.377 0.000 - 4.000 ng/mL   Hemoglobin A1c   Result Value Ref Range    Hemoglobin A1C 5.6 4 - 5.6 %   Results for orders placed or performed in visit on 01/04/19   CBC Auto Differential   Result Value Ref Range    WBC 4.35 3.20 - 9.80 10*3/mm3    RBC 5.06 4.37 - 5.74 10*6/mm3    Hemoglobin 15.6 13.7 - 17.3 g/dL    Hematocrit 45.1 39.0 - 49.0 %    MCV 89.1 80.0 - 98.0 fL    MCH 30.8 26.5 - 34.0 pg    MCHC 34.6 31.5 - 36.3 g/dL    RDW 12.7 11.5 - 14.5 %    RDW-SD 41.3 35.1 - 43.9 fl    MPV 9.9 8.0 - 12.0 fL    Platelets 171 150 - 450 10*3/mm3    Neutrophil % 54.5 37.0 - 80.0 %    Lymphocyte % 34.5 10.0 - 50.0 %    Monocyte % 8.7 0.0 - 12.0 %    Eosinophil % 1.8 0.0 - 7.0 %    Basophil % 0.0 0.0 - 2.0 %    Immature Grans % 0.5 0.0 - 0.5 %    Neutrophils, Absolute 2.37 2.00 - 8.60 10*3/mm3    Lymphocytes, Absolute 1.50 0.60 - 4.20 10*3/mm3    Monocytes, Absolute 0.38 0.00 - 0.90 10*3/mm3    Eosinophils, Absolute 0.08 0.00 - 0.70 10*3/mm3    Basophils, Absolute 0.00 0.00 - 0.20 10*3/mm3    Immature Grans, Absolute 0.02 0.00 - 0.02 10*3/mm3   Vitamin D 25 Hydroxy   Result Value Ref Range    25 Hydroxy, Vitamin D 55.5 30.0 - 100.0 ng/ml   TSH   Result Value Ref Range    TSH 1.330 0.460 - 4.680 mIU/mL   Testosterone   Result Value Ref Range    Testosterone, Total 255 (L) 264 - 916 ng/dL   Magnesium   Result Value Ref Range    Magnesium 2.4 (H) 1.6 - 2.3 mg/dL   Iron   Result Value Ref Range    Iron 114 49 - 181 mcg/dL    Vitamin B12   Result Value Ref Range    Vitamin B-12 346 239 - 931 pg/mL   Lipid Panel   Result Value Ref Range    Total Cholesterol 156 0 - 199 mg/dL    Triglycerides 70 20 - 199 mg/dL    HDL Cholesterol 48 (L) 60 - 200 mg/dL    LDL Cholesterol  94 1 - 129 mg/dL    LDL/HDL Ratio 1.96 0.00 - 3.55   Comprehensive Metabolic Panel   Result Value Ref Range    Glucose 107 (H) 60 - 100 mg/dL    BUN 13 7 - 21 mg/dL    Creatinine 0.93 0.70 - 1.30 mg/dL    Sodium 141 137 - 145 mmol/L    Potassium 4.4 3.5 - 5.1 mmol/L    Chloride 102 95 - 110 mmol/L    CO2 29.0 22.0 - 31.0 mmol/L    Calcium 9.0 8.4 - 10.2 mg/dL    Total Protein 7.2 6.3 - 8.6 g/dL    Albumin 4.30 3.40 - 4.80 g/dL    ALT (SGPT) 40 21 - 72 U/L    AST (SGOT) 45 17 - 59 U/L    Alkaline Phosphatase 49 38 - 126 U/L    Total Bilirubin 0.8 0.2 - 1.3 mg/dL    eGFR Non  Amer 86 63 - 147 mL/min/1.73    Globulin 2.9 2.3 - 3.5 gm/dL    A/G Ratio 1.5 1.1 - 1.8 g/dL    BUN/Creatinine Ratio 14.0 7.0 - 25.0    Anion Gap 10.0 5.0 - 15.0 mmol/L     *Note: Due to a large number of results and/or encounters for the requested time period, some results have not been displayed. A complete set of results can be found in Results Review.

## 2020-03-10 ENCOUNTER — HOSPITAL ENCOUNTER (OUTPATIENT)
Facility: HOSPITAL | Age: 50
Setting detail: HOSPITAL OUTPATIENT SURGERY
Discharge: HOME | End: 2020-03-10
Attending: INTERNAL MEDICINE | Admitting: INTERNAL MEDICINE

## 2020-03-10 ENCOUNTER — ANESTHESIA EVENT (OUTPATIENT)
Dept: GASTROENTEROLOGY | Facility: HOSPITAL | Age: 50
End: 2020-03-10

## 2020-03-10 ENCOUNTER — ANESTHESIA (OUTPATIENT)
Dept: GASTROENTEROLOGY | Facility: HOSPITAL | Age: 50
End: 2020-03-10

## 2020-03-10 VITALS
RESPIRATION RATE: 18 BRPM | TEMPERATURE: 97.2 F | HEART RATE: 58 BPM | SYSTOLIC BLOOD PRESSURE: 118 MMHG | BODY MASS INDEX: 31.12 KG/M2 | HEIGHT: 69 IN | OXYGEN SATURATION: 98 % | DIASTOLIC BLOOD PRESSURE: 75 MMHG | WEIGHT: 210.1 LBS

## 2020-03-10 DIAGNOSIS — Z12.11 ENCOUNTER FOR SCREENING FOR MALIGNANT NEOPLASM OF COLON: ICD-10-CM

## 2020-03-10 PROCEDURE — 88305 TISSUE EXAM BY PATHOLOGIST: CPT | Performed by: PATHOLOGY

## 2020-03-10 PROCEDURE — 25010000002 PROPOFOL 10 MG/ML EMULSION: Performed by: NURSE ANESTHETIST, CERTIFIED REGISTERED

## 2020-03-10 PROCEDURE — 45380 COLONOSCOPY AND BIOPSY: CPT | Performed by: INTERNAL MEDICINE

## 2020-03-10 PROCEDURE — 88305 TISSUE EXAM BY PATHOLOGIST: CPT | Performed by: INTERNAL MEDICINE

## 2020-03-10 RX ORDER — PROPOFOL 10 MG/ML
VIAL (ML) INTRAVENOUS AS NEEDED
Status: DISCONTINUED | OUTPATIENT
Start: 2020-03-10 | End: 2020-03-10 | Stop reason: SURG

## 2020-03-10 RX ORDER — DEXTROSE AND SODIUM CHLORIDE 5; .45 G/100ML; G/100ML
30 INJECTION, SOLUTION INTRAVENOUS CONTINUOUS PRN
Status: DISCONTINUED | OUTPATIENT
Start: 2020-03-10 | End: 2020-03-10 | Stop reason: HOSPADM

## 2020-03-10 RX ORDER — LIDOCAINE HYDROCHLORIDE 20 MG/ML
INJECTION, SOLUTION INTRAVENOUS AS NEEDED
Status: DISCONTINUED | OUTPATIENT
Start: 2020-03-10 | End: 2020-03-10 | Stop reason: SURG

## 2020-03-10 RX ADMIN — PROPOFOL 50 MG: 10 INJECTION, EMULSION INTRAVENOUS at 14:48

## 2020-03-10 RX ADMIN — PROPOFOL 30 MG: 10 INJECTION, EMULSION INTRAVENOUS at 14:51

## 2020-03-10 RX ADMIN — DEXTROSE AND SODIUM CHLORIDE 30 ML/HR: 5; 450 INJECTION, SOLUTION INTRAVENOUS at 13:54

## 2020-03-10 RX ADMIN — PROPOFOL 50 MG: 10 INJECTION, EMULSION INTRAVENOUS at 14:54

## 2020-03-10 RX ADMIN — LIDOCAINE HYDROCHLORIDE 50 MG: 20 INJECTION, SOLUTION INTRAVENOUS at 14:51

## 2020-03-10 NOTE — ANESTHESIA PREPROCEDURE EVALUATION
Anesthesia Evaluation     Patient summary reviewed and Nursing notes reviewed   NPO Solid Status: > 6 hours  NPO Liquid Status: > 2 hours           Airway   Mallampati: II  TM distance: >3 FB  Neck ROM: full  no difficulty expected  Dental    (+) poor dentition    Pulmonary - normal exam   (+) COPD moderate,   Cardiovascular - negative cardio ROS    Rhythm: regular  Rate: normal        Neuro/Psych- negative ROS  GI/Hepatic/Renal/Endo    (+) obesity, morbid obesity, hiatal hernia, GERD well controlled,      Musculoskeletal (-) negative ROS    Abdominal    Substance History - negative use     OB/GYN          Other - negative ROS                         Anesthesia Plan    ASA 3     MAC       Anesthetic plan, all risks, benefits, and alternatives have been provided, discussed and informed consent has been obtained with: patient.    Plan discussed with CRNA.

## 2020-03-10 NOTE — ANESTHESIA POSTPROCEDURE EVALUATION
Patient: Jonathon Carrero    Procedure Summary     Date:  03/10/20 Room / Location:  [facility] ENDOSCOPY 1 / [facility] ENDOSCOPY    Anesthesia Start:  1438 Anesthesia Stop:  1506    Procedure:  COLONOSCOPY Request Tuesday appt (N/A ) Diagnosis:       Encounter for screening for malignant neoplasm of colon      (Encounter for screening for malignant neoplasm of colon [Z12.11])    Surgeon:  Lorenzo Milan MD Provider:  True Johnson CRNA    Anesthesia Type:  MAC ASA Status:  3          Anesthesia Type: MAC    Vitals  No vitals data found for the desired time range.          Post Anesthesia Care and Evaluation    Patient location during evaluation: bedside  Patient participation: complete - patient participated  Level of consciousness: awake  Pain score: 0  Pain management: adequate  Airway patency: patent  Anesthetic complications: No anesthetic complications  PONV Status: none  Cardiovascular status: acceptable  Respiratory status: acceptable  Hydration status: acceptable

## 2020-03-12 LAB
LAB AP CASE REPORT: NORMAL
PATH REPORT.FINAL DX SPEC: NORMAL
PATH REPORT.GROSS SPEC: NORMAL

## 2020-03-17 ENCOUNTER — OFFICE VISIT (OUTPATIENT)
Dept: GASTROENTEROLOGY | Facility: CLINIC | Age: 50
End: 2020-03-17

## 2020-03-17 VITALS
HEART RATE: 62 BPM | BODY MASS INDEX: 32.14 KG/M2 | SYSTOLIC BLOOD PRESSURE: 112 MMHG | HEIGHT: 69 IN | WEIGHT: 217 LBS | DIASTOLIC BLOOD PRESSURE: 72 MMHG

## 2020-03-17 DIAGNOSIS — K62.1 RECTAL POLYP: Primary | ICD-10-CM

## 2020-03-17 PROCEDURE — 99212 OFFICE O/P EST SF 10 MIN: CPT | Performed by: NURSE PRACTITIONER

## 2020-03-17 NOTE — PATIENT INSTRUCTIONS
Colon Polyps    Polyps are tissue growths inside the body. Polyps can grow in many places, including the large intestine (colon). A polyp may be a round bump or a mushroom-shaped growth. You could have one polyp or several.  Most colon polyps are noncancerous (benign). However, some colon polyps can become cancerous over time. Finding and removing the polyps early can help prevent this.  What are the causes?  The exact cause of colon polyps is not known.  What increases the risk?  You are more likely to develop this condition if you:  · Have a family history of colon cancer or colon polyps.  · Are older than 50 or older than 45 if you are .  · Have inflammatory bowel disease, such as ulcerative colitis or Crohn's disease.  · Have certain hereditary conditions, such as:  ? Familial adenomatous polyposis.  ? Horn syndrome.  ? Turcot syndrome.  ? Peutz-Jeghers syndrome.  · Are overweight.  · Smoke cigarettes.  · Do not get enough exercise.  · Drink too much alcohol.  · Eat a diet that is high in fat and red meat and low in fiber.  · Had childhood cancer that was treated with abdominal radiation.  What are the signs or symptoms?  Most polyps do not cause symptoms.  If you have symptoms, they may include:  · Blood coming from your rectum when having a bowel movement.  · Blood in your stool. The stool may look dark red or black.  · Abdominal pain.  · A change in bowel habits, such as constipation or diarrhea.  How is this diagnosed?  This condition is diagnosed with a colonoscopy. This is a procedure in which a lighted, flexible scope is inserted into the anus and then passed into the colon to examine the area. Polyps are sometimes found when a colonoscopy is done as part of routine cancer screening tests.  How is this treated?  Treatment for this condition involves removing any polyps that are found. Most polyps can be removed during a colonoscopy. Those polyps will then be tested for cancer. Additional  treatment may be needed depending on the results of testing.  Follow these instructions at home:  Lifestyle  · Maintain a healthy weight, or lose weight if recommended by your health care provider.  · Exercise every day or as told by your health care provider.  · Do not use any products that contain nicotine or tobacco, such as cigarettes and e-cigarettes. If you need help quitting, ask your health care provider.  · If you drink alcohol, limit how much you have:  ? 0-1 drink a day for women.  ? 0-2 drinks a day for men.  · Be aware of how much alcohol is in your drink. In the [place] one drink equals one 12 oz bottle of beer (355 mL), one 5 oz glass of wine (148 mL), or one 1½ oz shot of hard liquor (44 mL).  Eating and drinking    · Eat foods that are high in fiber, such as fruits, vegetables, and whole grains.  · Eat foods that are high in calcium and vitamin D, such as milk, cheese, yogurt, eggs, liver, fish, and broccoli.  · Limit foods that are high in fat, such as fried foods and desserts.  · Limit the amount of red meat and processed meat you eat, such as hot dogs, sausage, brink, and lunch meats.  General instructions  · Keep all follow-up visits as told by your health care provider. This is important.  ? This includes having regularly scheduled colonoscopies.  ? Talk to your health care provider about when you need a colonoscopy.  Contact a health care provider if:  · You have new or worsening bleeding during a bowel movement.  · You have new or increased blood in your stool.  · You have a change in bowel habits.  · You lose weight for no known reason.  Summary  · Polyps are tissue growths inside the body. Polyps can grow in many places, including the colon.  · Most colon polyps are noncancerous (benign), but some can become cancerous over time.  · This condition is diagnosed with a colonoscopy.  · Treatment for this condition involves removing any polyps that are found. Most polyps can be removed during a  colonoscopy.  This information is not intended to replace advice given to you by your health care provider. Make sure you discuss any questions you have with your health care provider.  Document Released: 09/13/2005 Document Revised: 04/04/2019 Document Reviewed: 04/04/2019  [employer] Patient Education © 2020 [employer]

## 2020-03-17 NOTE — PROGRESS NOTES
Chief Complaint   Patient presents with   • Colon Polyps       Subjective    Jonathon Carrero is a 49 y.o. male. he is here today for follow-up.    History of Present Illness  49-year-old male presents to discuss screening colonoscopy results.  Denies any abdominal pain, nausea, vomiting or change within his bowel habits.  He underwent colonoscopy 3/10/2020 noted 3 sessile polyps which were removed from the rectum they were 2 3 mm in size.  Resection retrieval was complete.  Nonbleeding internal hemorrhoids were noted described as small exam was otherwise without abnormality.  Polyps were found to have no significant histologic abnormality measuring 0.5 x 0.3 x 0.2 together.       The following portions of the patient's history were reviewed and updated as appropriate:   Past Medical History:   Diagnosis Date   • Acquired hypothyroidism    • Contracture of ankle    • Deformity of calcaneus    • GERD (gastroesophageal reflux disease)    • Hiatal hernia    • Iron deficiency anemia    • Malaise and fatigue    • Obesity     bmi [id number]   • Peroneal tendinitis    • TMJ (temporomandibular joint disorder)      Past Surgical History:   Procedure Laterality Date   • COLONOSCOPY  03/15/2013    Internal and external hemorrhoids found.   • COLONOSCOPY N/A 3/10/2020    Procedure: COLONOSCOPY Request Tuesday appt;  Surgeon: Lorenzo Milan MD;  Location: [facility] ENDOSCOPY;  Service: Gastroenterology;  Laterality: N/A;   • DEBRIDEMENT LEG  12/04/2014    Debridement and repair of peroneal tendon as well as resection of peroneal tubercle right   • ENDOSCOPY  03/15/2013    esophagitis seen. Biopsy taken. A hiatus hernia found in the stomach. Gastritis found in the stomach. Biopsy taken. Normal duodenum.   • ENDOSCOPY  06/16/2011    esophagitis seen. Biopsy taken. A hiatus hernia found in the stomach. Gastritis found in the stomach. Biopsy taken. Normal duodenum.   • ENDOSCOPY N/A 5/26/2017    Procedure: ESOPHAGOGASTRODUODENOSCOPY  possible dilation ;  Surgeon: Murali Brower MD;  Location: [facility] ENDOSCOPY;  Service:    • ENTEROSCOPY SMALL BOWEL  03/15/2013    Capsule took greater then 3 hours to pass through stomach. Small bowel appeared normal. Although prp was robert.   • HERNIA REPAIR     • LIPOSUCTION ABDOMINAL     • NISSEN FUNDOPLICATION N/A 2017    Procedure: NISSEN FUNDOPLICATION LAPAROSCOPIC ;  Surgeon: Ozzie Barger MD;  Location: [facility] OR;  Service:      Family History   Problem Relation Age of Onset   • Diabetes Other    • Heart attack Father        Prior to Admission medications    Medication Sig Start Date End Date Taking? Authorizing Provider   albuterol sulfate  (90 Base) MCG/ACT inhaler Inhale 2 puffs Every 4 (Four) Hours As Needed for Wheezing. 19  Yes Jhon Higgins,    SYNTHROID 125 MCG tablet Take 1 tablet by mouth Daily. 20  Yes Solange Morin APRN   sodium-potassium-magnesium sulfates (SUPREP BOWEL PREP KIT) 17.5-3.13-1.6 GM/177ML solution oral solution Take 1 bottle by mouth Every 12 (Twelve) Hours. 3/3/20   Betsy Parikh APRN     No Known Allergies  Social History     Socioeconomic History   • Marital status:      Spouse name: Not on file   • Number of children: Not on file   • Years of education: Not on file   • Highest education level: Not on file   Tobacco Use   • Smoking status: Former Smoker     Years: 14.00     Last attempt to quit: 2000     Years since quittin.2   • Smokeless tobacco: Never Used   Substance and Sexual Activity   • Alcohol use: Yes     Comment: seldom   • Drug use: No   • Sexual activity: Defer       Review of Systems  Review of Systems   Constitutional: Negative for activity change, appetite change, chills, diaphoresis, fatigue, fever and unexpected weight change.   HENT: Negative for sore throat and trouble swallowing.    Respiratory: Negative for shortness of breath.    Gastrointestinal: Negative for abdominal distention, abdominal pain, anal  "bleeding, blood in stool, constipation, diarrhea, nausea, rectal pain and vomiting.   Musculoskeletal: Negative for arthralgias.   Skin: Negative for pallor.   Neurological: Negative for light-headedness.        /72 (BP Location: Left arm)   Pulse 62   Ht 175.3 cm (69\")   Wt 98.4 kg (217 lb)   BMI 32.05 kg/m²     Objective    Physical Exam   Constitutional: He is oriented to person, place, and time. He appears well-developed and well-nourished. He is cooperative. No distress.   HENT:   Head: Normocephalic and atraumatic.   Neck: Normal range of motion. Neck supple. No thyromegaly present.   Cardiovascular: Normal rate, regular rhythm and normal heart sounds.   Pulmonary/Chest: Effort normal and breath sounds normal. He has no wheezes. He has no rhonchi. He has no rales.   Abdominal: Soft. Normal appearance and bowel sounds are normal. He exhibits no distension. There is no hepatosplenomegaly. There is no tenderness. There is no rigidity and no guarding. No hernia.   Lymphadenopathy:     He has no cervical adenopathy.   Neurological: He is alert and oriented to person, place, and time.   Skin: Skin is warm, dry and intact. No rash noted. No pallor.   Psychiatric: He has a normal mood and affect. His speech is normal.     Admission on 03/10/2020, Discharged on 03/10/2020   Component Date Value Ref Range Status   • Case Report 03/10/2020    Final                    Value:Surgical Pathology Report                         Case: [id number]                                  Authorizing Provider:  Lorenzo Milan MD      Collected:           03/10/2020 03:05 PM          Ordering Location:     [facility]             Received:            03/11/2020 06:56 AM                                 [place] [facility]                                                     Pathologist:           Mynor Montelongo MD                                                           Specimen:    Large Intestine, Rectum, polyp x 3  " "                                                      • Final Diagnosis 03/10/2020    Final                    Value:This result contains rich text formatting which cannot be displayed here.   • Gross Description 03/10/2020    Final                    Value:This result contains rich text formatting which cannot be displayed here.     Assessment/Plan      1. Rectal polyp    .       Orders placed during this encounter include:  No orders of the defined types were placed in this encounter.      * Surgery not found *    Review and/or summary of lab tests, radiology, procedures, medications. Review and summary of old records and obtaining of history. The risks and benefits of my recommendations, as well as other treatment options were discussed with the patient today. Questions were answered.    No orders of the defined types were placed in this encounter.      Follow-up: Return if symptoms worsen or fail to improve.          This document has been electronically signed by LANRE Choe on March 17, 2020 12:54             Results for orders placed or performed during the hospital encounter of 03/10/20   Tissue Pathology Exam   Result Value Ref Range    Case Report       Surgical Pathology Report                         Case: [id number]                                  Authorizing Provider:  Lorenzo Milan MD      Collected:           03/10/2020 03:05 PM          Ordering Location:     [facility]             Received:            03/11/2020 06:56 AM                                 [place] [facility]                                                     Pathologist:           Mynor Montelongo MD                                                           Specimen:    Large Intestine, Rectum, polyp x 3                                                         Final Diagnosis       RECTUM, BIOPSY:  NO SIGNIFICANT HISTOLOGIC ABNORMALITY.      Gross Description       The specimen is labeled \"RP\".  Three tan " fragments measure 0.5 x 0.3 x 0.2 cm, together.  Totally submitted.     Results for orders placed or performed in visit on 02/10/20   PSA Screen   Result Value Ref Range    PSA 0.505 0.000 - 4.000 ng/mL   CBC Auto Differential   Result Value Ref Range    WBC 6.11 3.40 - 10.80 10*3/mm3    RBC 4.95 4.14 - 5.80 10*6/mm3    Hemoglobin 15.4 13.0 - 17.7 g/dL    Hematocrit 45.3 37.5 - 51.0 %    MCV 91.5 79.0 - 97.0 fL    MCH 31.1 26.6 - 33.0 pg    MCHC 34.0 31.5 - 35.7 g/dL    RDW 13.7 12.3 - 15.4 %    RDW-SD 45.8 37.0 - 54.0 fl    MPV 10.2 6.0 - 12.0 fL    Platelets 190 140 - 450 10*3/mm3    Neutrophil % 63.8 42.7 - 76.0 %    Lymphocyte % 25.9 19.6 - 45.3 %    Monocyte % 7.2 5.0 - 12.0 %    Eosinophil % 2.5 0.3 - 6.2 %    Basophil % 0.3 0.0 - 1.5 %    Immature Grans % 0.3 0.0 - 0.5 %    Neutrophils, Absolute 3.90 1.70 - 7.00 10*3/mm3    Lymphocytes, Absolute 1.58 0.70 - 3.10 10*3/mm3    Monocytes, Absolute 0.44 0.10 - 0.90 10*3/mm3    Eosinophils, Absolute 0.15 0.00 - 0.40 10*3/mm3    Basophils, Absolute 0.02 0.00 - 0.20 10*3/mm3    Immature Grans, Absolute 0.02 0.00 - 0.05 10*3/mm3    nRBC 0.2 0.0 - 0.2 /100 WBC   Vitamin D 25 Hydroxy   Result Value Ref Range    25 Hydroxy, Vitamin D 40.5 30.0 - 100.0 ng/ml   TSH   Result Value Ref Range    TSH 2.710 0.270 - 4.200 uIU/mL   Testosterone   Result Value Ref Range    Testosterone, Total 247.00 193.00 - 740.00 ng/dL   Magnesium   Result Value Ref Range    Magnesium 2.4 1.6 - 2.6 mg/dL   Iron   Result Value Ref Range    Iron 77 59 - 158 mcg/dL   Hemoglobin A1c   Result Value Ref Range    Hemoglobin A1C 5.71 (H) 4.80 - 5.60 %   Vitamin B12   Result Value Ref Range    Vitamin B-12 529 211 - 946 pg/mL   Lipid Panel   Result Value Ref Range    Total Cholesterol 176 0 - 200 mg/dL    Triglycerides 90 0 - 150 mg/dL    HDL Cholesterol 48 40 - 60 mg/dL    LDL Cholesterol  110 (H) 0 - 100 mg/dL    VLDL Cholesterol 18 5 - 40 mg/dL    LDL/HDL Ratio 2.29    Comprehensive Metabolic Panel    Result Value Ref Range    Glucose 104 (H) 65 - 99 mg/dL    BUN 15 6 - 20 mg/dL    Creatinine 1.04 0.76 - 1.27 mg/dL    Sodium 142 136 - 145 mmol/L    Potassium 5.0 3.5 - 5.2 mmol/L    Chloride 102 98 - 107 mmol/L    CO2 28.0 22.0 - 29.0 mmol/L    Calcium 9.4 8.6 - 10.5 mg/dL    Total Protein 7.4 6.0 - 8.5 g/dL    Albumin 4.80 3.50 - 5.20 g/dL    ALT (SGPT) 26 1 - 41 U/L    AST (SGOT) 22 1 - 40 U/L    Alkaline Phosphatase 53 39 - 117 U/L    Total Bilirubin 0.5 0.2 - 1.2 mg/dL    eGFR Non African Amer 76 >60 mL/min/1.73    Globulin 2.6 gm/dL    A/G Ratio 1.8 g/dL    BUN/Creatinine Ratio 14.4 7.0 - 25.0    Anion Gap 12.0 5.0 - 15.0 mmol/L   Results for orders placed or performed in visit on 01/10/19   PSA Screen   Result Value Ref Range    PSA 0.377 0.000 - 4.000 ng/mL   Hemoglobin A1c   Result Value Ref Range    Hemoglobin A1C 5.6 4 - 5.6 %   Results for orders placed or performed in visit on 01/04/19   CBC Auto Differential   Result Value Ref Range    WBC 4.35 3.20 - 9.80 10*3/mm3    RBC 5.06 4.37 - 5.74 10*6/mm3    Hemoglobin 15.6 13.7 - 17.3 g/dL    Hematocrit 45.1 39.0 - 49.0 %    MCV 89.1 80.0 - 98.0 fL    MCH 30.8 26.5 - 34.0 pg    MCHC 34.6 31.5 - 36.3 g/dL    RDW 12.7 11.5 - 14.5 %    RDW-SD 41.3 35.1 - 43.9 fl    MPV 9.9 8.0 - 12.0 fL    Platelets 171 150 - 450 10*3/mm3    Neutrophil % 54.5 37.0 - 80.0 %    Lymphocyte % 34.5 10.0 - 50.0 %    Monocyte % 8.7 0.0 - 12.0 %    Eosinophil % 1.8 0.0 - 7.0 %    Basophil % 0.0 0.0 - 2.0 %    Immature Grans % 0.5 0.0 - 0.5 %    Neutrophils, Absolute 2.37 2.00 - 8.60 10*3/mm3    Lymphocytes, Absolute 1.50 0.60 - 4.20 10*3/mm3    Monocytes, Absolute 0.38 0.00 - 0.90 10*3/mm3    Eosinophils, Absolute 0.08 0.00 - 0.70 10*3/mm3    Basophils, Absolute 0.00 0.00 - 0.20 10*3/mm3    Immature Grans, Absolute 0.02 0.00 - 0.02 10*3/mm3   Vitamin D 25 Hydroxy   Result Value Ref Range    25 Hydroxy, Vitamin D 55.5 30.0 - 100.0 ng/ml   TSH   Result Value Ref Range    TSH 1.330  0.460 - 4.680 mIU/mL   Testosterone   Result Value Ref Range    Testosterone, Total 255 (L) 264 - 916 ng/dL   Magnesium   Result Value Ref Range    Magnesium 2.4 (H) 1.6 - 2.3 mg/dL   Iron   Result Value Ref Range    Iron 114 49 - 181 mcg/dL   Vitamin B12   Result Value Ref Range    Vitamin B-12 346 239 - 931 pg/mL   Lipid Panel   Result Value Ref Range    Total Cholesterol 156 0 - 199 mg/dL    Triglycerides 70 20 - 199 mg/dL    HDL Cholesterol 48 (L) 60 - 200 mg/dL    LDL Cholesterol  94 1 - 129 mg/dL    LDL/HDL Ratio 1.96 0.00 - 3.55   Comprehensive Metabolic Panel   Result Value Ref Range    Glucose 107 (H) 60 - 100 mg/dL    BUN 13 7 - 21 mg/dL    Creatinine 0.93 0.70 - 1.30 mg/dL    Sodium 141 137 - 145 mmol/L    Potassium 4.4 3.5 - 5.1 mmol/L    Chloride 102 95 - 110 mmol/L    CO2 29.0 22.0 - 31.0 mmol/L    Calcium 9.0 8.4 - 10.2 mg/dL    Total Protein 7.2 6.3 - 8.6 g/dL    Albumin 4.30 3.40 - 4.80 g/dL    ALT (SGPT) 40 21 - 72 U/L    AST (SGOT) 45 17 - 59 U/L    Alkaline Phosphatase 49 38 - 126 U/L    Total Bilirubin 0.8 0.2 - 1.3 mg/dL    eGFR Non  Amer 86 63 - 147 mL/min/1.73    Globulin 2.9 2.3 - 3.5 gm/dL    A/G Ratio 1.5 1.1 - 1.8 g/dL    BUN/Creatinine Ratio 14.0 7.0 - 25.0    Anion Gap 10.0 5.0 - 15.0 mmol/L     *Note: Due to a large number of results and/or encounters for the requested time period, some results have not been displayed. A complete set of results can be found in Results Review.

## 2021-01-20 DIAGNOSIS — E03.9 HYPOTHYROIDISM (ACQUIRED): ICD-10-CM

## 2021-01-20 RX ORDER — LEVOTHYROXINE SODIUM 0.12 MG/1
125 TABLET ORAL DAILY
Qty: 30 TABLET | Refills: 11 | Status: SHIPPED | OUTPATIENT
Start: 2021-01-20 | End: 2022-01-24 | Stop reason: SDUPTHER

## 2022-01-24 ENCOUNTER — OFFICE VISIT (OUTPATIENT)
Dept: FAMILY MEDICINE CLINIC | Facility: CLINIC | Age: 52
End: 2022-01-24

## 2022-01-24 VITALS
HEART RATE: 76 BPM | SYSTOLIC BLOOD PRESSURE: 140 MMHG | DIASTOLIC BLOOD PRESSURE: 90 MMHG | WEIGHT: 222 LBS | HEIGHT: 69 IN | OXYGEN SATURATION: 98 % | BODY MASS INDEX: 32.88 KG/M2

## 2022-01-24 DIAGNOSIS — E03.9 HYPOTHYROIDISM (ACQUIRED): Primary | ICD-10-CM

## 2022-01-24 DIAGNOSIS — Z12.5 SCREENING FOR PROSTATE CANCER: ICD-10-CM

## 2022-01-24 DIAGNOSIS — R73.9 HYPERGLYCEMIA: ICD-10-CM

## 2022-01-24 DIAGNOSIS — I10 PRIMARY HYPERTENSION: ICD-10-CM

## 2022-01-24 PROCEDURE — 99214 OFFICE O/P EST MOD 30 MIN: CPT | Performed by: NURSE PRACTITIONER

## 2022-01-24 RX ORDER — LOSARTAN POTASSIUM 50 MG/1
50 TABLET ORAL DAILY
Qty: 90 TABLET | Refills: 3 | Status: SHIPPED | OUTPATIENT
Start: 2022-01-24 | End: 2022-08-29 | Stop reason: SDUPTHER

## 2022-01-24 RX ORDER — LEVOTHYROXINE SODIUM 0.12 MG/1
125 TABLET ORAL DAILY
Qty: 90 TABLET | Refills: 3 | Status: SHIPPED | OUTPATIENT
Start: 2022-01-24 | End: 2022-08-29 | Stop reason: SDUPTHER

## 2022-01-24 NOTE — PROGRESS NOTES
Chief Complaint   Patient presents with   • Hypothyroidism   • Hyperglycemia   • Med Refill     Subjective   Jonathon Carrero is a 51 y.o. male.           Presents with recheck of health care issues-needs to review labs thyroid     Hypothyroidism  This is a chronic problem. The problem occurs constantly. The problem has been resolved. Pertinent negatives include no chills, diaphoresis, fatigue, fever, myalgias, swollen glands or visual change. Treatments tried: synthroid  The treatment provided significant relief.   Hyperglycemia  This is a recurrent problem. The current episode started more than 1 month ago. The problem occurs intermittently. The problem has been gradually worsening. Pertinent negatives include no chills, diaphoresis, fatigue, fever, myalgias, swollen glands or visual change. Treatments tried: synthroid    Thyroid Problem  Presents for follow-up visit. Patient reports no anxiety, cold intolerance, constipation, depressed mood, diaphoresis, dry skin, fatigue, heat intolerance, leg swelling, nail problem, palpitations, tremors, visual change or weight loss. The symptoms have been stable. His past medical history is significant for diabetes. There is no history of heart failure.   Blood Sugar Problem  This is a recurrent problem. The current episode started more than 1 month ago. The problem occurs constantly. The problem has been resolved. Pertinent negatives include no chills, diaphoresis, fatigue, fever, myalgias, swollen glands or visual change. Treatments tried: eating better  The treatment provided significant relief.   Hypertension  This is a recurrent problem. The current episode started more than 1 month ago. The problem has been gradually worsening since onset. The problem is controlled. Associated symptoms include malaise/fatigue. Pertinent negatives include no anxiety, blurred vision, orthopnea, palpitations, peripheral edema, PND, shortness of breath or sweats. Risk factors for  "coronary artery disease include male gender. The current treatment provides moderate improvement. Compliance problems include diet.  There is no history of angina, kidney disease, CAD/MI, CVA, heart failure, left ventricular hypertrophy, PVD or retinopathy. Identifiable causes of hypertension include a thyroid problem.        The following portions of the patient's history were reviewed and updated as appropriate: allergies, current medications, past social history and problem list.    Review of Systems   Constitutional: Positive for activity change and malaise/fatigue. Negative for appetite change, chills, diaphoresis, fatigue, fever, unexpected weight change and weight loss.   Eyes: Negative.  Negative for blurred vision, photophobia, pain, discharge, redness, itching and visual disturbance.   Respiratory: Negative.  Negative for shortness of breath.    Cardiovascular: Negative.  Negative for palpitations, orthopnea and PND.   Gastrointestinal: Negative.  Negative for constipation.   Endocrine: Negative.  Negative for cold intolerance and heat intolerance.   Genitourinary: Negative.  Negative for difficulty urinating, dysuria, enuresis and flank pain.   Musculoskeletal: Negative.  Negative for myalgias.   Allergic/Immunologic: Negative.  Negative for environmental allergies, food allergies and immunocompromised state.   Neurological: Negative for dizziness, tremors and facial asymmetry.   Hematological: Negative.  Negative for adenopathy. Does not bruise/bleed easily.   Psychiatric/Behavioral: Negative.  Negative for agitation and behavioral problems. The patient is not nervous/anxious.        Objective   /90   Pulse 76   Ht 175.3 cm (69\")   Wt 101 kg (222 lb)   SpO2 98%   BMI 32.78 kg/m²   Physical Exam  Vitals and nursing note reviewed.   HENT:      Head: Normocephalic.      Right Ear: Tympanic membrane normal. There is no impacted cerumen.      Left Ear: There is no impacted cerumen.      Nose: Nose " normal.      Mouth/Throat:      Mouth: Mucous membranes are moist.   Eyes:      Pupils: Pupils are equal, round, and reactive to light.   Cardiovascular:      Rate and Rhythm: Normal rate.      Pulses: Normal pulses.      Heart sounds: No murmur heard.  No friction rub. No gallop.    Pulmonary:      Effort: Pulmonary effort is normal. No respiratory distress.      Breath sounds: No stridor. No wheezing or rhonchi.   Abdominal:      General: Abdomen is flat. There is no distension.      Palpations: Abdomen is soft. There is no mass.      Tenderness: There is no abdominal tenderness.      Hernia: No hernia is present.   Musculoskeletal:         General: Normal range of motion.      Cervical back: Normal range of motion.   Skin:     General: Skin is warm and dry.      Coloration: Skin is not jaundiced or pale.      Findings: No bruising or erythema.   Neurological:      General: No focal deficit present.      Mental Status: He is alert and oriented to person, place, and time.      Cranial Nerves: No cranial nerve deficit.      Sensory: No sensory deficit.      Motor: No weakness.      Coordination: Coordination normal.   Psychiatric:         Mood and Affect: Mood normal.         Behavior: Behavior normal.              Assessment/Plan     Problems Addressed this Visit        Endocrine and Metabolic    Hypothyroidism (acquired) - Primary    Relevant Medications    losartan (Cozaar) 50 MG tablet    levothyroxine (Synthroid) 125 MCG tablet    Other Relevant Orders    CBC & Differential    Comprehensive Metabolic Panel    Hemoglobin A1c    Vitamin D 25 Hydroxy    Vitamin B12    TSH    Iron    PSA Screen    Hepatitis C Antibody    Hyperglycemia    Relevant Medications    losartan (Cozaar) 50 MG tablet    Other Relevant Orders    CBC & Differential    Comprehensive Metabolic Panel    Hemoglobin A1c    Vitamin D 25 Hydroxy    Vitamin B12    TSH    Iron    PSA Screen    Hepatitis C Antibody      Other Visit Diagnoses      Primary hypertension        Relevant Medications    losartan (Cozaar) 50 MG tablet    Other Relevant Orders    CBC & Differential    Comprehensive Metabolic Panel    Hemoglobin A1c    Vitamin D 25 Hydroxy    Vitamin B12    TSH    Iron    PSA Screen    Hepatitis C Antibody    Screening for prostate cancer        Relevant Orders    PSA Screen      Diagnoses       Codes Comments    Hypothyroidism (acquired)    -  Primary ICD-10-CM: E03.9  ICD-9-CM: 244.9     Hyperglycemia     ICD-10-CM: R73.9  ICD-9-CM: 790.29     Primary hypertension     ICD-10-CM: I10  ICD-9-CM: 401.9     Screening for prostate cancer     ICD-10-CM: Z12.5  ICD-9-CM: V76.44            New Medications Ordered This Visit   Medications   • losartan (Cozaar) 50 MG tablet     Sig: Take 1 tablet by mouth Daily.     Dispense:  90 tablet     Refill:  3   • levothyroxine (Synthroid) 125 MCG tablet     Sig: Take 1 tablet by mouth Daily.     Dispense:  90 tablet     Refill:  3     Current Outpatient Medications on File Prior to Visit   Medication Sig Dispense Refill   • [DISCONTINUED] levothyroxine (Synthroid) 125 MCG tablet Take 1 tablet by mouth Daily. 30 tablet 11   • [DISCONTINUED] albuterol sulfate  (90 Base) MCG/ACT inhaler Inhale 2 puffs Every 4 (Four) Hours As Needed for Wheezing. 1 inhaler 0     No current facility-administered medications on file prior to visit.       20 ,minutes  Follow Up   Return in about 1 year (around 1/24/2023).          It's not just what you eat, but when you eat  Eat breakfast, and eat smaller meals throughout the day. A healthy breakfast can jumpstart your metabolism, while eating small, healthy meals (rather than the standard three large meals) keeps your energy up.   Avoid eating at night. Try to eat dinner earlier and fast for 14-16 hours until breakfast the next morning. Studies suggest that eating only when you’re most active and giving your digestive system a long break each day may help to regulate weight.    .  Recheck yearly as directed   Add losartan daily, monitor blood pressure     Labs as directed

## 2022-01-28 ENCOUNTER — LAB (OUTPATIENT)
Dept: LAB | Facility: HOSPITAL | Age: 52
End: 2022-01-28

## 2022-01-28 PROCEDURE — 82607 VITAMIN B-12: CPT | Performed by: NURSE PRACTITIONER

## 2022-01-28 PROCEDURE — 83036 HEMOGLOBIN GLYCOSYLATED A1C: CPT | Performed by: NURSE PRACTITIONER

## 2022-01-28 PROCEDURE — 82306 VITAMIN D 25 HYDROXY: CPT | Performed by: NURSE PRACTITIONER

## 2022-01-28 PROCEDURE — 80050 GENERAL HEALTH PANEL: CPT | Performed by: NURSE PRACTITIONER

## 2022-01-28 PROCEDURE — 83540 ASSAY OF IRON: CPT | Performed by: NURSE PRACTITIONER

## 2022-01-28 PROCEDURE — 86803 HEPATITIS C AB TEST: CPT | Performed by: NURSE PRACTITIONER

## 2022-01-28 PROCEDURE — 36415 COLL VENOUS BLD VENIPUNCTURE: CPT | Performed by: NURSE PRACTITIONER

## 2022-01-28 PROCEDURE — G0103 PSA SCREENING: HCPCS | Performed by: NURSE PRACTITIONER

## 2022-01-29 LAB
25(OH)D3 SERPL-MCNC: 34.6 NG/ML (ref 30–100)
ALBUMIN SERPL-MCNC: 4.6 G/DL (ref 3.5–5.2)
ALBUMIN/GLOB SERPL: 1.8 G/DL
ALP SERPL-CCNC: 50 U/L (ref 39–117)
ALT SERPL W P-5'-P-CCNC: 28 U/L (ref 1–41)
ANION GAP SERPL CALCULATED.3IONS-SCNC: 11.4 MMOL/L (ref 5–15)
AST SERPL-CCNC: 21 U/L (ref 1–40)
BASOPHILS # BLD AUTO: 0.01 10*3/MM3 (ref 0–0.2)
BASOPHILS NFR BLD AUTO: 0.2 % (ref 0–1.5)
BILIRUB SERPL-MCNC: 0.8 MG/DL (ref 0–1.2)
BUN SERPL-MCNC: 12 MG/DL (ref 6–20)
BUN/CREAT SERPL: 13.2 (ref 7–25)
CALCIUM SPEC-SCNC: 9.3 MG/DL (ref 8.6–10.5)
CHLORIDE SERPL-SCNC: 105 MMOL/L (ref 98–107)
CO2 SERPL-SCNC: 24.6 MMOL/L (ref 22–29)
CREAT SERPL-MCNC: 0.91 MG/DL (ref 0.76–1.27)
DEPRECATED RDW RBC AUTO: 45.4 FL (ref 37–54)
EOSINOPHIL # BLD AUTO: 0.07 10*3/MM3 (ref 0–0.4)
EOSINOPHIL NFR BLD AUTO: 1.4 % (ref 0.3–6.2)
ERYTHROCYTE [DISTWIDTH] IN BLOOD BY AUTOMATED COUNT: 13.8 % (ref 12.3–15.4)
GFR SERPL CREATININE-BSD FRML MDRD: 87 ML/MIN/1.73
GLOBULIN UR ELPH-MCNC: 2.6 GM/DL
GLUCOSE SERPL-MCNC: 95 MG/DL (ref 65–99)
HBA1C MFR BLD: 6 % (ref 4.8–5.6)
HCT VFR BLD AUTO: 45.7 % (ref 37.5–51)
HCV AB SER DONR QL: NORMAL
HGB BLD-MCNC: 15.4 G/DL (ref 13–17.7)
IMM GRANULOCYTES # BLD AUTO: 0.01 10*3/MM3 (ref 0–0.05)
IMM GRANULOCYTES NFR BLD AUTO: 0.2 % (ref 0–0.5)
IRON 24H UR-MRATE: 145 MCG/DL (ref 59–158)
LYMPHOCYTES # BLD AUTO: 1.42 10*3/MM3 (ref 0.7–3.1)
LYMPHOCYTES NFR BLD AUTO: 28 % (ref 19.6–45.3)
MCH RBC QN AUTO: 30.4 PG (ref 26.6–33)
MCHC RBC AUTO-ENTMCNC: 33.7 G/DL (ref 31.5–35.7)
MCV RBC AUTO: 90.3 FL (ref 79–97)
MONOCYTES # BLD AUTO: 0.31 10*3/MM3 (ref 0.1–0.9)
MONOCYTES NFR BLD AUTO: 6.1 % (ref 5–12)
NEUTROPHILS NFR BLD AUTO: 3.26 10*3/MM3 (ref 1.7–7)
NEUTROPHILS NFR BLD AUTO: 64.1 % (ref 42.7–76)
NRBC BLD AUTO-RTO: 0 /100 WBC (ref 0–0.2)
PLATELET # BLD AUTO: 192 10*3/MM3 (ref 140–450)
PMV BLD AUTO: 10.7 FL (ref 6–12)
POTASSIUM SERPL-SCNC: 4 MMOL/L (ref 3.5–5.2)
PROT SERPL-MCNC: 7.2 G/DL (ref 6–8.5)
PSA SERPL-MCNC: 0.82 NG/ML (ref 0–4)
RBC # BLD AUTO: 5.06 10*6/MM3 (ref 4.14–5.8)
SODIUM SERPL-SCNC: 141 MMOL/L (ref 136–145)
TSH SERPL DL<=0.05 MIU/L-ACNC: 4.2 UIU/ML (ref 0.27–4.2)
VIT B12 BLD-MCNC: 372 PG/ML (ref 211–946)
WBC NRBC COR # BLD: 5.08 10*3/MM3 (ref 3.4–10.8)

## 2022-01-31 ENCOUNTER — TELEPHONE (OUTPATIENT)
Dept: FAMILY MEDICINE CLINIC | Facility: CLINIC | Age: 52
End: 2022-01-31

## 2022-01-31 NOTE — TELEPHONE ENCOUNTER
Per LANRE Narvaez, Mr. Carrero has been called with recent lab results & recommendations.  Continue current medications and follow-up as planned or sooner if any problems.       ----- Message from LANRE Ko sent at 1/31/2022  7:42 AM CST -----  Can you let him know labs are good , a1c was a little high but overall good, no changes to be made at this time.

## 2022-01-31 NOTE — PROGRESS NOTES
Per LANRE Narvaez, Mr. Carrero has been called with recent lab results & recommendations.  Continue current medications and follow-up as planned or sooner if any problems.

## 2022-08-18 ENCOUNTER — TELEPHONE (OUTPATIENT)
Dept: FAMILY MEDICINE CLINIC | Facility: CLINIC | Age: 52
End: 2022-08-18

## 2022-08-18 DIAGNOSIS — Z12.5 SCREENING FOR PROSTATE CANCER: ICD-10-CM

## 2022-08-18 DIAGNOSIS — Z00.00 GENERAL MEDICAL EXAM: Primary | ICD-10-CM

## 2022-08-26 ENCOUNTER — LAB (OUTPATIENT)
Dept: LAB | Facility: HOSPITAL | Age: 52
End: 2022-08-26

## 2022-08-26 PROCEDURE — G0103 PSA SCREENING: HCPCS | Performed by: NURSE PRACTITIONER

## 2022-08-26 PROCEDURE — 80061 LIPID PANEL: CPT | Performed by: NURSE PRACTITIONER

## 2022-08-26 PROCEDURE — 36415 COLL VENOUS BLD VENIPUNCTURE: CPT | Performed by: NURSE PRACTITIONER

## 2022-08-26 PROCEDURE — 82607 VITAMIN B-12: CPT | Performed by: NURSE PRACTITIONER

## 2022-08-26 PROCEDURE — 83540 ASSAY OF IRON: CPT | Performed by: NURSE PRACTITIONER

## 2022-08-26 PROCEDURE — 82306 VITAMIN D 25 HYDROXY: CPT | Performed by: NURSE PRACTITIONER

## 2022-08-26 PROCEDURE — 83036 HEMOGLOBIN GLYCOSYLATED A1C: CPT | Performed by: NURSE PRACTITIONER

## 2022-08-26 PROCEDURE — 80050 GENERAL HEALTH PANEL: CPT | Performed by: NURSE PRACTITIONER

## 2022-08-27 LAB
25(OH)D3 SERPL-MCNC: 38 NG/ML (ref 30–100)
ALBUMIN SERPL-MCNC: 4.1 G/DL (ref 3.5–5.2)
ALBUMIN/GLOB SERPL: 1.8 G/DL
ALP SERPL-CCNC: 56 U/L (ref 39–117)
ALT SERPL W P-5'-P-CCNC: 29 U/L (ref 1–41)
ANION GAP SERPL CALCULATED.3IONS-SCNC: 10.1 MMOL/L (ref 5–15)
AST SERPL-CCNC: 23 U/L (ref 1–40)
BASOPHILS # BLD AUTO: 0.01 10*3/MM3 (ref 0–0.2)
BASOPHILS NFR BLD AUTO: 0.2 % (ref 0–1.5)
BILIRUB SERPL-MCNC: 0.7 MG/DL (ref 0–1.2)
BUN SERPL-MCNC: 15 MG/DL (ref 6–20)
BUN/CREAT SERPL: 14 (ref 7–25)
CALCIUM SPEC-SCNC: 9.1 MG/DL (ref 8.6–10.5)
CHLORIDE SERPL-SCNC: 105 MMOL/L (ref 98–107)
CHOLEST SERPL-MCNC: 160 MG/DL (ref 0–200)
CO2 SERPL-SCNC: 25.9 MMOL/L (ref 22–29)
CREAT SERPL-MCNC: 1.07 MG/DL (ref 0.76–1.27)
DEPRECATED RDW RBC AUTO: 47.2 FL (ref 37–54)
EGFRCR SERPLBLD CKD-EPI 2021: 83 ML/MIN/1.73
EOSINOPHIL # BLD AUTO: 0.14 10*3/MM3 (ref 0–0.4)
EOSINOPHIL NFR BLD AUTO: 2.4 % (ref 0.3–6.2)
ERYTHROCYTE [DISTWIDTH] IN BLOOD BY AUTOMATED COUNT: 14 % (ref 12.3–15.4)
GLOBULIN UR ELPH-MCNC: 2.3 GM/DL
GLUCOSE SERPL-MCNC: 104 MG/DL (ref 65–99)
HBA1C MFR BLD: 5.5 % (ref 4.8–5.6)
HCT VFR BLD AUTO: 42.3 % (ref 37.5–51)
HDLC SERPL-MCNC: 51 MG/DL (ref 40–60)
HGB BLD-MCNC: 14.2 G/DL (ref 13–17.7)
IMM GRANULOCYTES # BLD AUTO: 0.02 10*3/MM3 (ref 0–0.05)
IMM GRANULOCYTES NFR BLD AUTO: 0.3 % (ref 0–0.5)
IRON 24H UR-MRATE: 53 MCG/DL (ref 59–158)
LDLC SERPL CALC-MCNC: 81 MG/DL (ref 0–100)
LDLC/HDLC SERPL: 1.48 {RATIO}
LYMPHOCYTES # BLD AUTO: 1.5 10*3/MM3 (ref 0.7–3.1)
LYMPHOCYTES NFR BLD AUTO: 25.4 % (ref 19.6–45.3)
MCH RBC QN AUTO: 31 PG (ref 26.6–33)
MCHC RBC AUTO-ENTMCNC: 33.6 G/DL (ref 31.5–35.7)
MCV RBC AUTO: 92.4 FL (ref 79–97)
MONOCYTES # BLD AUTO: 0.43 10*3/MM3 (ref 0.1–0.9)
MONOCYTES NFR BLD AUTO: 7.3 % (ref 5–12)
NEUTROPHILS NFR BLD AUTO: 3.8 10*3/MM3 (ref 1.7–7)
NEUTROPHILS NFR BLD AUTO: 64.4 % (ref 42.7–76)
NRBC BLD AUTO-RTO: 0 /100 WBC (ref 0–0.2)
PLATELET # BLD AUTO: 176 10*3/MM3 (ref 140–450)
PMV BLD AUTO: 10.3 FL (ref 6–12)
POTASSIUM SERPL-SCNC: 3.8 MMOL/L (ref 3.5–5.2)
PROT SERPL-MCNC: 6.4 G/DL (ref 6–8.5)
PSA SERPL-MCNC: 0.74 NG/ML (ref 0–4)
RBC # BLD AUTO: 4.58 10*6/MM3 (ref 4.14–5.8)
SODIUM SERPL-SCNC: 141 MMOL/L (ref 136–145)
TRIGL SERPL-MCNC: 167 MG/DL (ref 0–150)
TSH SERPL DL<=0.05 MIU/L-ACNC: 1.75 UIU/ML (ref 0.27–4.2)
VIT B12 BLD-MCNC: 291 PG/ML (ref 211–946)
VLDLC SERPL-MCNC: 28 MG/DL (ref 5–40)
WBC NRBC COR # BLD: 5.9 10*3/MM3 (ref 3.4–10.8)

## 2022-08-29 ENCOUNTER — OFFICE VISIT (OUTPATIENT)
Dept: FAMILY MEDICINE CLINIC | Facility: CLINIC | Age: 52
End: 2022-08-29

## 2022-08-29 VITALS
SYSTOLIC BLOOD PRESSURE: 122 MMHG | OXYGEN SATURATION: 98 % | DIASTOLIC BLOOD PRESSURE: 84 MMHG | BODY MASS INDEX: 32.73 KG/M2 | WEIGHT: 221 LBS | HEIGHT: 69 IN | HEART RATE: 62 BPM

## 2022-08-29 DIAGNOSIS — I83.813 VARICOSE VEINS OF BOTH LOWER EXTREMITIES WITH PAIN: ICD-10-CM

## 2022-08-29 DIAGNOSIS — R73.9 HYPERGLYCEMIA: ICD-10-CM

## 2022-08-29 DIAGNOSIS — I10 PRIMARY HYPERTENSION: ICD-10-CM

## 2022-08-29 DIAGNOSIS — E03.9 HYPOTHYROIDISM (ACQUIRED): Primary | ICD-10-CM

## 2022-08-29 PROCEDURE — 99214 OFFICE O/P EST MOD 30 MIN: CPT | Performed by: NURSE PRACTITIONER

## 2022-08-29 RX ORDER — LOSARTAN POTASSIUM 50 MG/1
50 TABLET ORAL DAILY
Qty: 90 TABLET | Refills: 3 | Status: SHIPPED | OUTPATIENT
Start: 2022-08-29

## 2022-08-29 RX ORDER — LEVOTHYROXINE SODIUM 0.12 MG/1
125 TABLET ORAL DAILY
Qty: 90 TABLET | Refills: 3 | Status: SHIPPED | OUTPATIENT
Start: 2022-08-29

## 2022-08-29 NOTE — PROGRESS NOTES
Chief Complaint   Patient presents with   • Hypothyroidism   • Cold Extremity     Legs feel cold     Subjective   Jnoathon Carrero is a 52 y.o. male.           Presents with recheck of health care issues-needs to review labs thyroid     Hypothyroidism  This is a chronic problem. The problem occurs constantly. The problem has been resolved. Associated symptoms include arthralgias, fatigue, joint swelling and myalgias. Pertinent negatives include no abdominal pain, chest pain, chills, congestion, coughing, diaphoresis, headaches, nausea, neck pain, numbness, urinary symptoms, vertigo, vomiting or weakness. Treatments tried: synthroid daily  The treatment provided significant relief.   Cold Extremity  Associated symptoms include arthralgias, fatigue, joint swelling and myalgias. Pertinent negatives include no abdominal pain, chest pain, chills, congestion, coughing, diaphoresis, headaches, nausea, neck pain, numbness, urinary symptoms, vertigo, vomiting or weakness.   Thyroid Problem  Presents for follow-up visit. Symptoms include fatigue. Patient reports no anxiety, cold intolerance, constipation, depressed mood, diaphoresis, dry skin, heat intolerance, leg swelling, nail problem, palpitations, tremors or weight loss. The symptoms have been stable. His past medical history is significant for diabetes. There is no history of heart failure.   Blood Sugar Problem  This is a recurrent problem. The current episode started more than 1 month ago. The problem occurs constantly. The problem has been resolved. Associated symptoms include arthralgias, fatigue, joint swelling and myalgias. Pertinent negatives include no abdominal pain, chest pain, chills, congestion, coughing, diaphoresis, headaches, nausea, neck pain, numbness, urinary symptoms, vertigo, vomiting or weakness. Treatments tried: eating better  The treatment provided significant relief.   Hypertension  This is a recurrent problem. The current episode started  more than 1 month ago. The problem has been gradually worsening since onset. The problem is controlled. Associated symptoms include malaise/fatigue. Pertinent negatives include no anxiety, chest pain, headaches, neck pain, palpitations, shortness of breath or sweats. Risk factors for coronary artery disease include male gender. Current antihypertension treatment includes angiotensin blockers and lifestyle changes. The current treatment provides moderate improvement. Compliance problems include diet.  There is no history of kidney disease, CAD/MI, CVA, heart failure, left ventricular hypertrophy, PVD or retinopathy. Identifiable causes of hypertension include a thyroid problem.   Leg Swelling  Associated symptoms include arthralgias, fatigue, joint swelling and myalgias. Pertinent negatives include no abdominal pain, chest pain, chills, congestion, coughing, diaphoresis, headaches, nausea, neck pain, numbness, urinary symptoms, vertigo, vomiting or weakness.        The following portions of the patient's history were reviewed and updated as appropriate: allergies, current medications, past social history and problem list.    Review of Systems   Constitutional: Positive for activity change, fatigue and malaise/fatigue. Negative for appetite change, chills, diaphoresis, unexpected weight change and weight loss.   HENT: Negative for congestion, dental problem, drooling, ear discharge, ear pain, facial swelling, mouth sores, postnasal drip, rhinorrhea, sinus pressure and sinus pain.    Eyes: Negative.  Negative for photophobia, pain, discharge, redness, itching and visual disturbance.   Respiratory: Negative.  Negative for apnea, cough, choking, chest tightness, shortness of breath, wheezing and stridor.    Cardiovascular: Positive for leg swelling. Negative for chest pain and palpitations.   Gastrointestinal: Negative.  Negative for abdominal distention, abdominal pain, anal bleeding, blood in stool, constipation,  "nausea and vomiting.   Endocrine: Negative.  Negative for cold intolerance and heat intolerance.   Genitourinary: Negative.  Negative for difficulty urinating, dysuria, enuresis and flank pain.   Musculoskeletal: Positive for arthralgias, back pain, gait problem, joint swelling and myalgias. Negative for neck pain.        Lower extremity pain and edema    Allergic/Immunologic: Negative.  Negative for environmental allergies, food allergies and immunocompromised state.   Neurological: Negative for dizziness, vertigo, tremors, seizures, syncope, facial asymmetry, speech difficulty, weakness, light-headedness, numbness and headaches.   Hematological: Negative.  Negative for adenopathy. Does not bruise/bleed easily.   Psychiatric/Behavioral: Negative.  Negative for agitation, behavioral problems, confusion, decreased concentration, dysphoric mood, hallucinations and self-injury. The patient is not nervous/anxious and is not hyperactive.        Objective   /84   Pulse 62   Ht 175.3 cm (69\")   Wt 100 kg (221 lb)   SpO2 98%   BMI 32.64 kg/m²   Physical Exam  Vitals and nursing note reviewed.   Constitutional:       General: He is not in acute distress.     Appearance: Normal appearance. He is not ill-appearing, toxic-appearing or diaphoretic.   HENT:      Head: Normocephalic and atraumatic.      Right Ear: Tympanic membrane normal. There is no impacted cerumen.      Left Ear: Tympanic membrane normal. There is no impacted cerumen.      Nose: Nose normal. No congestion or rhinorrhea.      Mouth/Throat:      Mouth: Mucous membranes are moist.      Pharynx: No oropharyngeal exudate or posterior oropharyngeal erythema.   Eyes:      General: No scleral icterus.        Right eye: No discharge.         Left eye: No discharge.      Pupils: Pupils are equal, round, and reactive to light.   Neck:      Vascular: No carotid bruit.   Cardiovascular:      Rate and Rhythm: Normal rate and regular rhythm.      Pulses: Normal " pulses.      Heart sounds: No murmur heard.    No friction rub. No gallop.   Pulmonary:      Effort: Pulmonary effort is normal. No respiratory distress.      Breath sounds: No stridor. No wheezing, rhonchi or rales.   Chest:      Chest wall: No tenderness.   Abdominal:      General: Abdomen is flat. There is no distension.      Palpations: Abdomen is soft. There is no mass.      Tenderness: There is no abdominal tenderness. There is no guarding or rebound.      Hernia: No hernia is present.   Musculoskeletal:         General: Tenderness present. No swelling, deformity or signs of injury. Normal range of motion.      Cervical back: Normal range of motion. No rigidity or tenderness.      Right lower leg: Tenderness present. No edema.      Left lower leg: Tenderness present. No edema.        Legs:       Comments: Varicose veins bilateral lower extremities    Lymphadenopathy:      Cervical: No cervical adenopathy.   Skin:     General: Skin is warm and dry.      Coloration: Skin is not jaundiced or pale.      Findings: No bruising, erythema, lesion or rash.   Neurological:      General: No focal deficit present.      Mental Status: He is alert and oriented to person, place, and time.      Cranial Nerves: No cranial nerve deficit.      Sensory: No sensory deficit.      Motor: No weakness.      Coordination: Coordination normal.      Gait: Gait normal.      Deep Tendon Reflexes: Reflexes normal.   Psychiatric:         Mood and Affect: Mood normal.         Behavior: Behavior normal.              Assessment & Plan     Problems Addressed this Visit        Endocrine and Metabolic    Hypothyroidism (acquired) - Primary    Relevant Medications    losartan (Cozaar) 50 MG tablet    levothyroxine (Synthroid) 125 MCG tablet    Other Relevant Orders    CBC & Differential    Comprehensive Metabolic Panel    Hemoglobin A1c    Iron    Lipid Panel    Vitamin D 25 Hydroxy    Vitamin B12    TSH    Hyperglycemia    Relevant Medications     losartan (Cozaar) 50 MG tablet    Other Relevant Orders    CBC & Differential    Comprehensive Metabolic Panel    Hemoglobin A1c    Iron    Lipid Panel    Vitamin D 25 Hydroxy    Vitamin B12    TSH      Other Visit Diagnoses     Varicose veins of both lower extremities with pain        Relevant Orders    Ambulatory Referral to Vascular Surgery (Completed)    CBC & Differential    Comprehensive Metabolic Panel    Hemoglobin A1c    Iron    Lipid Panel    Vitamin D 25 Hydroxy    Vitamin B12    TSH    Primary hypertension        Relevant Medications    losartan (Cozaar) 50 MG tablet    Other Relevant Orders    CBC & Differential    Comprehensive Metabolic Panel    Hemoglobin A1c    Iron    Lipid Panel    Vitamin D 25 Hydroxy    Vitamin B12    TSH      Diagnoses       Codes Comments    Hypothyroidism (acquired)    -  Primary ICD-10-CM: E03.9  ICD-9-CM: 244.9     Varicose veins of both lower extremities with pain     ICD-10-CM: I83.813  ICD-9-CM: 454.8     Hyperglycemia     ICD-10-CM: R73.9  ICD-9-CM: 790.29     Primary hypertension     ICD-10-CM: I10  ICD-9-CM: 401.9            New Medications Ordered This Visit   Medications   • losartan (Cozaar) 50 MG tablet     Sig: Take 1 tablet by mouth Daily.     Dispense:  90 tablet     Refill:  3   • levothyroxine (Synthroid) 125 MCG tablet     Sig: Take 1 tablet by mouth Daily.     Dispense:  90 tablet     Refill:  3     Current Outpatient Medications on File Prior to Visit   Medication Sig Dispense Refill   • [DISCONTINUED] levothyroxine (Synthroid) 125 MCG tablet Take 1 tablet by mouth Daily. 90 tablet 3   • [DISCONTINUED] losartan (Cozaar) 50 MG tablet Take 1 tablet by mouth Daily. 90 tablet 3     No current facility-administered medications on file prior to visit.       15 minutes   Follow Up   Return in about 6 months (around 2/28/2023).        It's not just what you eat, but when you eat  Eat breakfast, and eat smaller meals throughout the day. A healthy breakfast can  jumpstart your metabolism, while eating small, healthy meals (rather than the standard three large meals) keeps your energy up.   Avoid eating at night. Try to eat dinner earlier and fast for 14-16 hours until breakfast the next morning. Studies suggest that eating only when you’re most active and giving your digestive system a long break each day may help to regulate weight.     Compressions stockings as directed   otc is fine  Referral to vascular due to pain and edema worsening   Add slow iron daily-meds as directed  Diet discussed  Follow up in 6 months  Labs before visit

## 2023-09-04 DIAGNOSIS — I10 PRIMARY HYPERTENSION: ICD-10-CM

## 2023-09-04 DIAGNOSIS — E03.9 HYPOTHYROIDISM (ACQUIRED): ICD-10-CM

## 2023-09-04 DIAGNOSIS — R73.9 HYPERGLYCEMIA: ICD-10-CM

## 2023-09-04 RX ORDER — LOSARTAN POTASSIUM 50 MG/1
50 TABLET ORAL DAILY
Qty: 90 TABLET | Refills: 3 | Status: CANCELLED | OUTPATIENT
Start: 2023-09-04

## 2023-09-05 DIAGNOSIS — R73.9 HYPERGLYCEMIA: ICD-10-CM

## 2023-09-05 DIAGNOSIS — E03.9 HYPOTHYROIDISM (ACQUIRED): ICD-10-CM

## 2023-09-05 DIAGNOSIS — I10 PRIMARY HYPERTENSION: ICD-10-CM

## 2023-09-05 RX ORDER — LEVOTHYROXINE SODIUM 0.12 MG/1
125 TABLET ORAL DAILY
Qty: 90 TABLET | Refills: 0 | Status: SHIPPED | OUTPATIENT
Start: 2023-09-05

## 2023-09-05 RX ORDER — LOSARTAN POTASSIUM 50 MG/1
50 TABLET ORAL DAILY
Qty: 90 TABLET | Refills: 3 | Status: SHIPPED | OUTPATIENT
Start: 2023-09-05

## 2023-09-25 ENCOUNTER — LAB (OUTPATIENT)
Dept: LAB | Facility: HOSPITAL | Age: 53
End: 2023-09-25
Payer: COMMERCIAL

## 2023-09-25 ENCOUNTER — OFFICE VISIT (OUTPATIENT)
Dept: FAMILY MEDICINE CLINIC | Facility: CLINIC | Age: 53
End: 2023-09-25

## 2023-09-25 VITALS
OXYGEN SATURATION: 96 % | WEIGHT: 217 LBS | HEART RATE: 65 BPM | SYSTOLIC BLOOD PRESSURE: 112 MMHG | BODY MASS INDEX: 32.14 KG/M2 | DIASTOLIC BLOOD PRESSURE: 80 MMHG | HEIGHT: 69 IN

## 2023-09-25 DIAGNOSIS — I10 PRIMARY HYPERTENSION: ICD-10-CM

## 2023-09-25 DIAGNOSIS — Z12.5 PROSTATE CANCER SCREENING: ICD-10-CM

## 2023-09-25 DIAGNOSIS — E03.9 HYPOTHYROIDISM (ACQUIRED): Primary | ICD-10-CM

## 2023-09-25 DIAGNOSIS — L98.9 SKIN LESION: ICD-10-CM

## 2023-09-25 DIAGNOSIS — R05.1 ACUTE COUGH: ICD-10-CM

## 2023-09-25 PROCEDURE — 90471 IMMUNIZATION ADMIN: CPT | Performed by: NURSE PRACTITIONER

## 2023-09-25 PROCEDURE — 99214 OFFICE O/P EST MOD 30 MIN: CPT | Performed by: NURSE PRACTITIONER

## 2023-09-25 PROCEDURE — G0103 PSA SCREENING: HCPCS | Performed by: NURSE PRACTITIONER

## 2023-09-25 PROCEDURE — 90715 TDAP VACCINE 7 YRS/> IM: CPT | Performed by: NURSE PRACTITIONER

## 2023-09-25 PROCEDURE — 82306 VITAMIN D 25 HYDROXY: CPT | Performed by: NURSE PRACTITIONER

## 2023-09-25 PROCEDURE — 36415 COLL VENOUS BLD VENIPUNCTURE: CPT | Performed by: NURSE PRACTITIONER

## 2023-09-25 PROCEDURE — 80050 GENERAL HEALTH PANEL: CPT | Performed by: NURSE PRACTITIONER

## 2023-09-25 PROCEDURE — 83036 HEMOGLOBIN GLYCOSYLATED A1C: CPT | Performed by: NURSE PRACTITIONER

## 2023-09-25 PROCEDURE — 83540 ASSAY OF IRON: CPT | Performed by: NURSE PRACTITIONER

## 2023-09-25 PROCEDURE — 82607 VITAMIN B-12: CPT | Performed by: NURSE PRACTITIONER

## 2023-09-25 RX ORDER — LOSARTAN POTASSIUM 50 MG/1
50 TABLET ORAL DAILY
Qty: 90 TABLET | Refills: 3 | Status: SHIPPED | OUTPATIENT
Start: 2023-09-25

## 2023-09-25 RX ORDER — LEVOTHYROXINE SODIUM 0.12 MG/1
125 TABLET ORAL DAILY
Qty: 90 TABLET | Refills: 3 | Status: SHIPPED | OUTPATIENT
Start: 2023-09-25

## 2023-09-25 RX ORDER — ALBUTEROL SULFATE 90 UG/1
2 AEROSOL, METERED RESPIRATORY (INHALATION) EVERY 4 HOURS PRN
Qty: 8 G | Refills: 0 | Status: SHIPPED | OUTPATIENT
Start: 2023-09-25

## 2023-09-25 NOTE — PROGRESS NOTES
Chief Complaint   Patient presents with    Hypothyroidism    Suspicious Skin Lesion     Refill meds     Subjective   Jonathon Carrero is a 53 y.o. male.           History of Present Illness  Presents with recheck of health care issues-needs to review labs thyroid   Hypothyroidism  This is a chronic problem. The problem occurs constantly. The problem has been resolved. Associated symptoms include myalgias and a rash. Pertinent negatives include no chills, congestion, coughing, fever, headaches, joint swelling, nausea or numbness. Treatments tried: synthroid daily. The treatment provided significant relief.   Hypertension  This is a recurrent problem. The current episode started more than 1 month ago. The problem has been resolved since onset. The problem is controlled. Associated symptoms include malaise/fatigue. Pertinent negatives include no anxiety, headaches, shortness of breath or sweats. Risk factors for coronary artery disease include male gender. Past treatments include angiotensin blockers. Current antihypertension treatment includes angiotensin blockers and lifestyle changes. The current treatment provides moderate improvement. Compliance problems include diet.  There is no history of kidney disease, CAD/MI, CVA, left ventricular hypertrophy, PVD or retinopathy.      The following portions of the patient's history were reviewed and updated as appropriate: allergies, current medications, past social history and problem list.    Review of Systems   Constitutional:  Positive for activity change and malaise/fatigue. Negative for appetite change, chills, fever and unexpected weight change.   HENT:  Negative for congestion, dental problem, drooling, ear discharge, ear pain, facial swelling, mouth sores, postnasal drip, rhinorrhea, sinus pressure and sinus pain.    Eyes: Negative.  Negative for photophobia, pain, discharge, redness, itching and visual disturbance.   Respiratory: Negative.  Negative for apnea,  "cough, choking, chest tightness, shortness of breath, wheezing and stridor.    Cardiovascular:  Negative for leg swelling.   Gastrointestinal: Negative.  Negative for abdominal distention, anal bleeding, blood in stool and nausea.   Endocrine: Negative.  Negative for polydipsia, polyphagia and polyuria.   Genitourinary: Negative.  Negative for difficulty urinating, dysuria, enuresis and flank pain.   Musculoskeletal:  Positive for myalgias. Negative for back pain, gait problem and joint swelling.   Skin:  Positive for rash.        Changing skin lesion left side of face    Allergic/Immunologic: Negative.  Negative for environmental allergies, food allergies and immunocompromised state.   Neurological:  Negative for dizziness, tremors, seizures, syncope, facial asymmetry, speech difficulty, light-headedness, numbness and headaches.   Hematological: Negative.  Negative for adenopathy. Does not bruise/bleed easily.   Psychiatric/Behavioral: Negative.  Negative for agitation, behavioral problems, confusion, decreased concentration, dysphoric mood, hallucinations and self-injury. The patient is not hyperactive.      Objective   /80   Pulse 65   Ht 175.3 cm (69\")   Wt 98.4 kg (217 lb)   SpO2 96%   BMI 32.05 kg/m²   Physical Exam  Vitals and nursing note reviewed.   Constitutional:       General: He is not in acute distress.     Appearance: Normal appearance. He is not ill-appearing, toxic-appearing or diaphoretic.   HENT:      Head: Normocephalic and atraumatic.      Right Ear: Tympanic membrane normal. There is no impacted cerumen.      Left Ear: Tympanic membrane normal. There is no impacted cerumen.      Nose: Nose normal. No congestion or rhinorrhea.      Mouth/Throat:      Mouth: Mucous membranes are dry.      Pharynx: No oropharyngeal exudate or posterior oropharyngeal erythema.   Eyes:      General: No scleral icterus.        Right eye: No discharge.         Left eye: No discharge.      Pupils: Pupils are " equal, round, and reactive to light.   Neck:      Vascular: No carotid bruit.   Cardiovascular:      Rate and Rhythm: Normal rate and regular rhythm.      Pulses: Normal pulses.      Heart sounds: No murmur heard.    No friction rub. No gallop.   Pulmonary:      Effort: Pulmonary effort is normal. No respiratory distress.      Breath sounds: No stridor. No wheezing, rhonchi or rales.   Chest:      Chest wall: No tenderness.   Abdominal:      General: Abdomen is flat. There is no distension.      Palpations: Abdomen is soft. There is no mass.      Tenderness: There is no abdominal tenderness. There is no guarding or rebound.      Hernia: No hernia is present.   Musculoskeletal:         General: No swelling, tenderness, deformity or signs of injury. Normal range of motion.      Cervical back: Normal range of motion. No rigidity or tenderness.      Right lower leg: No edema.      Left lower leg: No edema.   Lymphadenopathy:      Cervical: No cervical adenopathy.   Skin:     General: Skin is warm and dry.      Coloration: Skin is not jaundiced or pale.      Findings: No bruising, erythema, lesion or rash.             Comments: Skin lesion to left side of face    Neurological:      General: No focal deficit present.      Mental Status: He is alert and oriented to person, place, and time.      Cranial Nerves: No cranial nerve deficit.      Sensory: No sensory deficit.      Motor: No weakness.      Coordination: Coordination normal.      Gait: Gait normal.      Deep Tendon Reflexes: Reflexes normal.   Psychiatric:         Mood and Affect: Mood normal.         Behavior: Behavior normal.            Assessment & Plan     Problems Addressed this Visit          Endocrine and Metabolic    Hypothyroidism (acquired) - Primary    Relevant Medications    levothyroxine (Synthroid) 125 MCG tablet    losartan (Cozaar) 50 MG tablet    Other Relevant Orders    CBC & Differential    Comprehensive Metabolic Panel    Hemoglobin A1c    Iron     Vitamin D,25-Hydroxy    Vitamin B12    TSH    PSA Screen     Other Visit Diagnoses       Primary hypertension        Relevant Medications    losartan (Cozaar) 50 MG tablet    Other Relevant Orders    CBC & Differential    Comprehensive Metabolic Panel    Hemoglobin A1c    Iron    Vitamin D,25-Hydroxy    Vitamin B12    TSH    PSA Screen    Acute cough        Relevant Medications    albuterol sulfate  (90 Base) MCG/ACT inhaler    Skin lesion        Relevant Orders    Ambulatory Referral to Dermatology    CBC & Differential    Comprehensive Metabolic Panel    Hemoglobin A1c    Iron    Vitamin D,25-Hydroxy    Vitamin B12    TSH    PSA Screen    Prostate cancer screening        Relevant Orders    PSA Screen          Diagnoses         Codes Comments    Hypothyroidism (acquired)    -  Primary ICD-10-CM: E03.9  ICD-9-CM: 244.9     Primary hypertension     ICD-10-CM: I10  ICD-9-CM: 401.9     Acute cough     ICD-10-CM: R05.1  ICD-9-CM: 786.2     Skin lesion     ICD-10-CM: L98.9  ICD-9-CM: 709.9     Prostate cancer screening     ICD-10-CM: Z12.5  ICD-9-CM: V76.44              New Medications Ordered This Visit   Medications    levothyroxine (Synthroid) 125 MCG tablet     Sig: Take 1 tablet by mouth Daily.     Dispense:  90 tablet     Refill:  3    losartan (Cozaar) 50 MG tablet     Sig: Take 1 tablet by mouth Daily.     Dispense:  90 tablet     Refill:  3    albuterol sulfate  (90 Base) MCG/ACT inhaler     Sig: Inhale 2 puffs Every 4 (Four) Hours As Needed for Wheezing.     Dispense:  8 g     Refill:  0     Current Outpatient Medications on File Prior to Visit   Medication Sig Dispense Refill    [DISCONTINUED] albuterol sulfate  (90 Base) MCG/ACT inhaler Inhale 2 puffs Every 4 (Four) Hours As Needed for Wheezing. 8 g 0    [DISCONTINUED] azithromycin (ZITHROMAX) 250 MG tablet Take 2 tablets the first day, then 1 tablet daily for 4 days. 6 tablet 0    [DISCONTINUED] levothyroxine (Synthroid) 125 MCG  tablet Take 1 tablet by mouth Daily. 90 tablet 0    [DISCONTINUED] losartan (Cozaar) 50 MG tablet Take 1 tablet by mouth Daily. 90 tablet 3    [DISCONTINUED] methylPREDNISolone (MEDROL) 4 MG dose pack Take as directed on package instructions. 21 each 0    [DISCONTINUED] promethazine-dextromethorphan (PROMETHAZINE-DM) 6.25-15 MG/5ML syrup Take 5 mL by mouth At Night As Needed for Cough. 120 mL 0     No current facility-administered medications on file prior to visit.       20 minutes  Follow Up   Return in about 6 months (around 3/25/2024).       It's not just what you eat, but when you eat  Eat breakfast, and eat smaller meals throughout the day. A healthy breakfast can jumpstart your metabolism, while eating small, healthy meals (rather than the standard three large meals) keeps your energy up.   Avoid eating at night. Try to eat dinner earlier and fast for 14-16 hours until breakfast the next morning. Studies suggest that eating only when you’re most active and giving your digestive system a long break each day may help to regulate weight.     Tdap as directed  Refill meds  See back 6 months  Labs today, no changes otherwise unless labs are abnormal

## 2023-09-26 LAB
25(OH)D3 SERPL-MCNC: 43.7 NG/ML (ref 30–100)
ALBUMIN SERPL-MCNC: 4.6 G/DL (ref 3.5–5.2)
ALBUMIN/GLOB SERPL: 2 G/DL
ALP SERPL-CCNC: 51 U/L (ref 39–117)
ALT SERPL W P-5'-P-CCNC: 21 U/L (ref 1–41)
ANION GAP SERPL CALCULATED.3IONS-SCNC: 16 MMOL/L (ref 5–15)
AST SERPL-CCNC: 26 U/L (ref 1–40)
BASOPHILS # BLD AUTO: 0.01 10*3/MM3 (ref 0–0.2)
BASOPHILS NFR BLD AUTO: 0.2 % (ref 0–1.5)
BILIRUB SERPL-MCNC: 0.6 MG/DL (ref 0–1.2)
BUN SERPL-MCNC: 13 MG/DL (ref 6–20)
BUN/CREAT SERPL: 12.6 (ref 7–25)
CALCIUM SPEC-SCNC: 9.6 MG/DL (ref 8.6–10.5)
CHLORIDE SERPL-SCNC: 105 MMOL/L (ref 98–107)
CO2 SERPL-SCNC: 22 MMOL/L (ref 22–29)
CREAT SERPL-MCNC: 1.03 MG/DL (ref 0.76–1.27)
DEPRECATED RDW RBC AUTO: 43.6 FL (ref 37–54)
EGFRCR SERPLBLD CKD-EPI 2021: 86.3 ML/MIN/1.73
EOSINOPHIL # BLD AUTO: 0.14 10*3/MM3 (ref 0–0.4)
EOSINOPHIL NFR BLD AUTO: 2.3 % (ref 0.3–6.2)
ERYTHROCYTE [DISTWIDTH] IN BLOOD BY AUTOMATED COUNT: 13.3 % (ref 12.3–15.4)
GLOBULIN UR ELPH-MCNC: 2.3 GM/DL
GLUCOSE SERPL-MCNC: 97 MG/DL (ref 65–99)
HBA1C MFR BLD: 5.6 % (ref 4.8–5.6)
HCT VFR BLD AUTO: 43.6 % (ref 37.5–51)
HGB BLD-MCNC: 15.1 G/DL (ref 13–17.7)
IMM GRANULOCYTES # BLD AUTO: 0.01 10*3/MM3 (ref 0–0.05)
IMM GRANULOCYTES NFR BLD AUTO: 0.2 % (ref 0–0.5)
IRON 24H UR-MRATE: 97 MCG/DL (ref 59–158)
LYMPHOCYTES # BLD AUTO: 1.52 10*3/MM3 (ref 0.7–3.1)
LYMPHOCYTES NFR BLD AUTO: 24.7 % (ref 19.6–45.3)
MCH RBC QN AUTO: 31.3 PG (ref 26.6–33)
MCHC RBC AUTO-ENTMCNC: 34.6 G/DL (ref 31.5–35.7)
MCV RBC AUTO: 90.3 FL (ref 79–97)
MONOCYTES # BLD AUTO: 0.46 10*3/MM3 (ref 0.1–0.9)
MONOCYTES NFR BLD AUTO: 7.5 % (ref 5–12)
NEUTROPHILS NFR BLD AUTO: 4.01 10*3/MM3 (ref 1.7–7)
NEUTROPHILS NFR BLD AUTO: 65.1 % (ref 42.7–76)
NRBC BLD AUTO-RTO: 0 /100 WBC (ref 0–0.2)
PLATELET # BLD AUTO: 182 10*3/MM3 (ref 140–450)
PMV BLD AUTO: 10.4 FL (ref 6–12)
POTASSIUM SERPL-SCNC: 4.2 MMOL/L (ref 3.5–5.2)
PROT SERPL-MCNC: 6.9 G/DL (ref 6–8.5)
PSA SERPL-MCNC: 1.19 NG/ML (ref 0–4)
RBC # BLD AUTO: 4.83 10*6/MM3 (ref 4.14–5.8)
SODIUM SERPL-SCNC: 143 MMOL/L (ref 136–145)
TSH SERPL DL<=0.05 MIU/L-ACNC: 1.79 UIU/ML (ref 0.27–4.2)
VIT B12 BLD-MCNC: 466 PG/ML (ref 211–946)
WBC NRBC COR # BLD: 6.15 10*3/MM3 (ref 3.4–10.8)

## 2023-09-27 ENCOUNTER — TELEPHONE (OUTPATIENT)
Dept: FAMILY MEDICINE CLINIC | Facility: CLINIC | Age: 53
End: 2023-09-27
Payer: COMMERCIAL

## 2023-09-27 NOTE — TELEPHONE ENCOUNTER
Jonathon left a message saying if Solange got his lab results back, he needs his new prescriptions called in and if you need to talk to him, to leave a message because he has a meeting this afternoon.

## (undated) DEVICE — GLV SURG SENSICARE ALOE LF PF SZ7.5 GRN

## (undated) DEVICE — ADHS LIQ MASTISOL 2/3ML

## (undated) DEVICE — HARMONIC ACE +7 LAPAROSCOPIC SHEARS ADVANCED HEMOSTASIS 5MM DIAMETER 36CM SHAFT LENGTH  FOR USE WITH GRAY HAND PIECE ONLY: Brand: HARMONIC ACE

## (undated) DEVICE — GLV SURG SENSICARE GREEN W/ALOE PF LF 7 STRL

## (undated) DEVICE — RETR ENDO RETRACT MAXI 10MM 32CM

## (undated) DEVICE — CORE TRUMPET FOR SINGLE SOLUTION BAG: Brand: CORE DYNAMICS

## (undated) DEVICE — TOTAL TRAY, 16FR 10ML SIL FOLEY, URN: Brand: MEDLINE

## (undated) DEVICE — BNDG ELAS ELITE V/CLOSE 4IN 5YD LF STRL

## (undated) DEVICE — DECANT BG O JET

## (undated) DEVICE — CANN SMPL SOFTECH BIFLO ETCO2 A/M 7FT

## (undated) DEVICE — WRAP LEG 31X48X6IN STRL

## (undated) DEVICE — GOWN,AURORA,NOREINF,RAGLAN,XL,STERILE: Brand: MEDLINE

## (undated) DEVICE — GRSPR BABCOCK 10MM 31CM 1P/U

## (undated) DEVICE — SINGLE-USE BIOPSY FORCEPS: Brand: RADIAL JAW 4

## (undated) DEVICE — SHEET,DRAPE,53X77,STERILE: Brand: MEDLINE

## (undated) DEVICE — SUT ETHLN 4/0 30IN 626H

## (undated) DEVICE — GLV SURG SENSICARE GREEN W/ALOE PF LF 8 STRL

## (undated) DEVICE — ENDOPATH XCEL BLADELESS TROCARS WITH STABILITY SLEEVES: Brand: ENDOPATH XCEL

## (undated) DEVICE — GLV SURG SENSICARE GREEN W/ALOE PF LF 6.5 STRL

## (undated) DEVICE — PAD GRND REM POLYHESIVE A/ DISP

## (undated) DEVICE — ANTIBACTERIAL UNDYED BRAIDED (POLYGLACTIN 910), SYNTHETIC ABSORBABLE SUTURE: Brand: COATED VICRYL

## (undated) DEVICE — GLV SURG TRIUMPH LT PF LTX 6.5 STRL

## (undated) DEVICE — PK LAP CHOLE LF 60

## (undated) DEVICE — MONOPOLAR METZENBAUM SCISSOR TIP, DISPOSABLE: Brand: MONOPOLAR METZENBAUM SCISSOR TIP, DISPOSABLE

## (undated) DEVICE — SOL IRR NACL 0.9PCT BT 1000ML

## (undated) DEVICE — GLV SURG TRIUMPH LT PF LTX 7.5 STRL

## (undated) DEVICE — TROCAR SITE CLOSURE DEVICE: Brand: ENDO CLOSE

## (undated) DEVICE — COATED BRAIDED POLYESTER: Brand: TI-CRON

## (undated) DEVICE — SUT VICRYL 0 TIES J112T

## (undated) DEVICE — STPLR SKIN VISISTAT WD 35CT

## (undated) DEVICE — BITEBLOCK ENDO W/STRAP 60F A/ LF DISP

## (undated) DEVICE — 3M™ STERI-STRIP™ REINFORCED ADHESIVE SKIN CLOSURES, R1547, 1/2 IN X 4 IN (12 MM X 100 MM), 6 STRIPS/ENVELOPE: Brand: 3M™ STERI-STRIP™